# Patient Record
Sex: FEMALE | Race: WHITE | Employment: OTHER | URBAN - METROPOLITAN AREA
[De-identification: names, ages, dates, MRNs, and addresses within clinical notes are randomized per-mention and may not be internally consistent; named-entity substitution may affect disease eponyms.]

---

## 2017-01-31 ENCOUNTER — HOSPITAL ENCOUNTER (OUTPATIENT)
Dept: NUCLEAR MEDICINE | Age: 67
Discharge: HOME | End: 2017-01-31
Attending: INTERNAL MEDICINE
Payer: MEDICARE

## 2017-01-31 DIAGNOSIS — K80.20 GALLSTONES: ICD-10-CM

## 2017-01-31 DIAGNOSIS — R11.2 NAUSEA AND VOMITING: ICD-10-CM

## 2017-01-31 PROCEDURE — 78264 GASTRIC EMPTYING IMG STUDY: CPT

## 2017-02-22 ENCOUNTER — ANESTHESIA (OUTPATIENT)
Dept: ENDOSCOPY | Age: 67
End: 2017-02-22
Payer: MEDICARE

## 2017-02-22 ENCOUNTER — SURGERY (OUTPATIENT)
Age: 67
End: 2017-02-22

## 2017-02-22 ENCOUNTER — ANESTHESIA EVENT (OUTPATIENT)
Dept: ENDOSCOPY | Age: 67
End: 2017-02-22
Payer: MEDICARE

## 2017-02-22 ENCOUNTER — HOSPITAL ENCOUNTER (OUTPATIENT)
Age: 67
Setting detail: OUTPATIENT SURGERY
Discharge: HOME | End: 2017-02-22
Attending: INTERNAL MEDICINE | Admitting: INTERNAL MEDICINE
Payer: MEDICARE

## 2017-02-22 VITALS
HEART RATE: 67 BPM | WEIGHT: 148.06 LBS | BODY MASS INDEX: 23.8 KG/M2 | RESPIRATION RATE: 20 BRPM | TEMPERATURE: 97.9 F | HEIGHT: 66 IN | DIASTOLIC BLOOD PRESSURE: 76 MMHG | OXYGEN SATURATION: 98 % | SYSTOLIC BLOOD PRESSURE: 110 MMHG

## 2017-02-22 LAB
H PYLORI FROM TISSUE: NEGATIVE
KIT LOT NO., HCLOLOT: NORMAL
NEGATIVE CONTROL: NEGATIVE
POSITIVE CONTROL: POSITIVE

## 2017-02-22 PROCEDURE — 76040000019: Performed by: INTERNAL MEDICINE

## 2017-02-22 PROCEDURE — 76060000031 HC ANESTHESIA FIRST 0.5 HR: Performed by: INTERNAL MEDICINE

## 2017-02-22 PROCEDURE — 74011250636 HC RX REV CODE- 250/636: Performed by: INTERNAL MEDICINE

## 2017-02-22 PROCEDURE — 77030019988 HC FCPS ENDOSC DISP BSC -B: Performed by: INTERNAL MEDICINE

## 2017-02-22 PROCEDURE — 74011250636 HC RX REV CODE- 250/636

## 2017-02-22 PROCEDURE — 88305 TISSUE EXAM BY PATHOLOGIST: CPT | Performed by: INTERNAL MEDICINE

## 2017-02-22 PROCEDURE — 74011000250 HC RX REV CODE- 250

## 2017-02-22 PROCEDURE — 87077 CULTURE AEROBIC IDENTIFY: CPT | Performed by: INTERNAL MEDICINE

## 2017-02-22 RX ORDER — FENTANYL CITRATE 50 UG/ML
25 INJECTION, SOLUTION INTRAMUSCULAR; INTRAVENOUS
Status: DISCONTINUED | OUTPATIENT
Start: 2017-02-22 | End: 2017-02-22 | Stop reason: HOSPADM

## 2017-02-22 RX ORDER — MIDAZOLAM HYDROCHLORIDE 1 MG/ML
1-2 INJECTION, SOLUTION INTRAMUSCULAR; INTRAVENOUS
Status: DISCONTINUED | OUTPATIENT
Start: 2017-02-22 | End: 2017-02-22 | Stop reason: HOSPADM

## 2017-02-22 RX ORDER — SODIUM CHLORIDE 0.9 % (FLUSH) 0.9 %
5-10 SYRINGE (ML) INJECTION EVERY 8 HOURS
Status: DISCONTINUED | OUTPATIENT
Start: 2017-02-22 | End: 2017-02-22 | Stop reason: HOSPADM

## 2017-02-22 RX ORDER — SODIUM CHLORIDE 9 MG/ML
100 INJECTION, SOLUTION INTRAVENOUS CONTINUOUS
Status: DISCONTINUED | OUTPATIENT
Start: 2017-02-22 | End: 2017-02-22 | Stop reason: HOSPADM

## 2017-02-22 RX ORDER — FLUMAZENIL 0.1 MG/ML
0.2 INJECTION INTRAVENOUS
Status: DISCONTINUED | OUTPATIENT
Start: 2017-02-22 | End: 2017-02-22 | Stop reason: HOSPADM

## 2017-02-22 RX ORDER — SODIUM CHLORIDE 0.9 % (FLUSH) 0.9 %
5-10 SYRINGE (ML) INJECTION AS NEEDED
Status: DISCONTINUED | OUTPATIENT
Start: 2017-02-22 | End: 2017-02-22 | Stop reason: HOSPADM

## 2017-02-22 RX ORDER — PROPOFOL 10 MG/ML
10-1000 INJECTION, EMULSION INTRAVENOUS
Status: DISCONTINUED | OUTPATIENT
Start: 2017-02-22 | End: 2017-02-22 | Stop reason: HOSPADM

## 2017-02-22 RX ORDER — ATROPINE SULFATE 0.1 MG/ML
0.5 INJECTION INTRAVENOUS
Status: DISCONTINUED | OUTPATIENT
Start: 2017-02-22 | End: 2017-02-22 | Stop reason: HOSPADM

## 2017-02-22 RX ORDER — PROPOFOL 10 MG/ML
INJECTION, EMULSION INTRAVENOUS AS NEEDED
Status: DISCONTINUED | OUTPATIENT
Start: 2017-02-22 | End: 2017-02-22 | Stop reason: HOSPADM

## 2017-02-22 RX ORDER — OMEPRAZOLE 40 MG/1
40 CAPSULE, DELAYED RELEASE ORAL DAILY
Qty: 30 CAP | Refills: 3 | Status: ON HOLD | OUTPATIENT
Start: 2017-02-22 | End: 2020-05-09

## 2017-02-22 RX ORDER — DEXTROMETHORPHAN/PSEUDOEPHED 2.5-7.5/.8
1.2 DROPS ORAL
Status: DISCONTINUED | OUTPATIENT
Start: 2017-02-22 | End: 2017-02-22 | Stop reason: HOSPADM

## 2017-02-22 RX ORDER — LIDOCAINE HYDROCHLORIDE 20 MG/ML
INJECTION, SOLUTION EPIDURAL; INFILTRATION; INTRACAUDAL; PERINEURAL AS NEEDED
Status: DISCONTINUED | OUTPATIENT
Start: 2017-02-22 | End: 2017-02-22 | Stop reason: HOSPADM

## 2017-02-22 RX ORDER — EPINEPHRINE 0.1 MG/ML
1 INJECTION INTRACARDIAC; INTRAVENOUS
Status: DISCONTINUED | OUTPATIENT
Start: 2017-02-22 | End: 2017-02-22 | Stop reason: HOSPADM

## 2017-02-22 RX ADMIN — LIDOCAINE HYDROCHLORIDE 20 MG: 20 INJECTION, SOLUTION EPIDURAL; INFILTRATION; INTRACAUDAL; PERINEURAL at 11:42

## 2017-02-22 RX ADMIN — PROPOFOL 30 MG: 10 INJECTION, EMULSION INTRAVENOUS at 11:43

## 2017-02-22 RX ADMIN — PROPOFOL 30 MG: 10 INJECTION, EMULSION INTRAVENOUS at 11:46

## 2017-02-22 RX ADMIN — PROPOFOL 30 MG: 10 INJECTION, EMULSION INTRAVENOUS at 11:45

## 2017-02-22 RX ADMIN — PROPOFOL 30 MG: 10 INJECTION, EMULSION INTRAVENOUS at 11:42

## 2017-02-22 RX ADMIN — SODIUM CHLORIDE 100 ML/HR: 900 INJECTION, SOLUTION INTRAVENOUS at 11:23

## 2017-02-22 RX ADMIN — PROPOFOL 30 MG: 10 INJECTION, EMULSION INTRAVENOUS at 11:47

## 2017-02-22 RX ADMIN — PROPOFOL 30 MG: 10 INJECTION, EMULSION INTRAVENOUS at 11:44

## 2017-02-22 NOTE — ROUTINE PROCESS
Amanda Springer  1950  [id number]    Situation:  Verbal report received from: Anegl Madison RN  Procedure: Procedure(s):  ESOPHAGOGASTRODUODENOSCOPY (EGD)  ESOPHAGOGASTRODUODENAL (EGD) BIOPSY    Background:    Preoperative diagnosis: NAUSEA,VOMITING  Postoperative diagnosis: esophagitis, gastritis    :  Dr. Saloni Atkins  Assistant(s): Endoscopy Technician-1: Shivani Batista  Endoscopy RN-1: Colonel Caden RN    Specimens:   ID Type Source Tests Collected by Time Destination   1 : duodenum biopsy Preservative Duodenum  Nunu Salazar MD 2/22/2017 1148 Pathology   2 : g e junction biopsy Preservative   Nunu Salazar MD 2/22/2017 1148 Pathology     H. Pylori  yes    Assessment:  Intra-procedure medications       Anesthesia gave intra-procedure sedation and medications, see anesthesia flow sheet yes    Intravenous fluids: NS@ KVO     Vital signs stable       Abdominal assessment: round and soft       Recommendation:  Discharge patient per MD order  .     Family or Friend Isaac Lasso Friend  Permission to share finding with family or friend yes

## 2017-02-22 NOTE — IP AVS SNAPSHOT
[address] [facility] 
[phone] Patient: June Patel MRN: [id number] QOP:[id number] You are allergic to the following Allergen Reactions Codeine Nausea and Vomiting Nsaids (Non-Steroidal Anti-Inflammatory Drug) Angioedema Oxycodone Nausea and Vomiting Percocet (Oxycodone-Acetaminophen) Nausea and Vomiting Recent Documentation Height 1.664 m Emergency Contacts Name Discharge Info Relation Home Work Mobile Tesfaye Molina DISCHARGE CAREGIVER [3] Son [22] [phone] About your hospitalization You were admitted on:  February 22, 2017 You last received care in the:  [facility] You were discharged on:  February 22, 2017 Unit phone number:  [phone] Why you were hospitalized Your primary diagnosis was:  Not on File Providers Seen During Your Hospitalizations Provider Role Specialty Primary office phone Tho Turner MD Attending Provider Gastroenterology [phone] Your Primary Care Physician (PCP) Primary Care Physician Office Phone Office Fax Idris Marin [phone] Follow-up Information Follow up With Details Comments Contact Info Annette Bradley, [address] [place] [address] 
[phone] Current Discharge Medication List  
  
START taking these medications Dose & Instructions Dispensing Information Comments Morning Noon Evening Bedtime  
 omeprazole 40 mg capsule Commonly known as:  PRILOSEC Your next dose is: Today, Tomorrow Other:  _________ Dose:  40 mg Take 1 Cap by mouth daily. Indications: GASTROESOPHAGEAL REFLUX, gastritis Quantity:  30 Cap Refills:  3 CONTINUE these medications which have NOT CHANGED Dose & Instructions Dispensing Information Comments Morning Noon Evening Bedtime ACETAMINOPHEN PO Your next dose is: Today, Tomorrow Other:  _________ Dose:  650 mg Take 650 mg by mouth every six (6) hours as needed. Refills:  0  
     
   
   
   
  
 aspirin 81 mg chewable tablet Your next dose is: Today, Tomorrow Other:  _________ Dose:  81 mg Take 81 mg by mouth daily. Refills:  0  
     
   
   
   
  
 cholecalciferol 400 unit Tab tablet Commonly known as:  VITAMIN D3 Your next dose is: Today, Tomorrow Other:  _________ Dose:  400 Units Take 400 Units by mouth daily. Refills:  0  
     
   
   
   
  
 melatonin 1 mg tablet Your next dose is: Today, Tomorrow Other:  _________ Take  by mouth. Refills:  0  
     
   
   
   
  
 SYNTHROID 200 mcg tablet Generic drug:  levothyroxine Your next dose is: Today, Tomorrow Other:  _________ Dose:  125 mcg Take 125 mcg by mouth Daily (before breakfast). Refills:  0 Where to Get Your Medications Information on where to get these meds will be given to you by the nurse or doctor. ! Ask your nurse or doctor about these medications  
  omeprazole 40 mg capsule Discharge Instructions Manav Neal MD 
[facility], [address] [place], [place] [phone] 
[email or web address] [id number] 
1950 EGD DISCHARGE INSTRUCTIONS Discomfort: 
Sore throat- throat lozenges or warm salt water gargle 
redness at IV site- apply warm compress to area; if redness or soreness persist- contact your physician Gaseous discomfort- walking, belching will help relieve any discomfort You may not operate a vehicle for 12 hours You may not engage in an occupation involving machinery or appliances for rest of today You may not drink alcoholic beverages for at least 12 hours Avoid making any critical decisions for at least 24 hour DIET You may have anything by mouth. You may eat and drink immediately. You may resume your regular diet  however -  remember your colon is empty and a heavy meal will produce gas. Avoid these foods:  vegetables, fried / greasy foods, carbonated drinks ACTIVITY You may resume your normal daily activities Spend the remainder of the day resting -  avoid any strenuous activity. CALL M.D. ANY SIGN OF Increasing pain, nausea, vomiting Abdominal distension (swelling) New increased bleeding (oral or rectal) Fever (chills) Pain in chest area Bloody discharge from nose or mouth Shortness of breath Follow-up Instructions: 
 Call Dr. Sahil Martinez for any questions or problems. Telephone # [phone] Dr. Minerva Cochran office will notify you of the biopsy results available  Within 7 to 10 days. We will call you or send a letter ENDOSCOPY FINDINGS: 
 Your endoscopy showed some mild inflammation in the esophagus and stomach so would like to start omeprazole 40 mg daily and see if it helps. DISCHARGE SUMMARY from Nurse The following personal items collected during your admission are returned to you:  
Dental Appliance:   
Vision: Visual Aid: Glasses Hearing Aid:   
Jewelry:   
Clothing:   
Other Valuables:   
Valuables sent to safe:   
 
Discharge Orders None Introducing [facility]! Flavia Moran introduces [employer] patient portal. Now you can access parts of your medical record, email your doctor's office, and request medication refills online. 1. In your internet browser, go to [email or web address] 2. Click on the First Time User? Click Here link in the Sign In box. You will see the New Member Sign Up page. 3. Enter your [employer] Access Code exactly as it appears below. You will not need to use this code after youve completed the sign-up process. If you do not sign up before the expiration date, you must request a new code. · [employer] Access Code: [email or web address] Expires: 3/12/2017  9:45 AM 
 
4. Enter the last four digits of your Social Security Number (xxxx) and Date of Birth (mm/dd/yyyy) as indicated and click Submit. You will be taken to the next sign-up page. 5. Create a mascotsecret ID. This will be your [employer]secret login ID and cannot be changed, so think of one that is secure and easy to remember. 6. Create a [employer] password. You can change your password at any time. 7. Enter your Password Reset Question and Answer. This can be used at a later time if you forget your password. 8. Enter your e-mail address. You will receive e-mail notification when new information is available in [address] 9. Click Sign Up. You can now view and download portions of your medical record. 10. Click the Download Summary menu link to download a portable copy of your medical information. If you have questions, please visit the Frequently Asked Questions section of the [employer] website. Remember, [employer] is NOT to be used for urgent needs. For medical emergencies, dial 911. Now available from your iPhone and Android! General Information Please provide this summary of care documentation to your next provider. Patient Signature:  ____________________________________________________________ Date:  ____________________________________________________________  
  
Hermilo Paris Provider Signature:  ____________________________________________________________ Date:  ____________________________________________________________

## 2017-02-22 NOTE — PROCEDURES
[facility]                   Endoscopic Gastroduodenoscopy Procedure Note      2/22/2017  Lino Vallejo  1950  [id number]    Procedure: Endoscopic Gastroduodenoscopy with biopsy    Indication:  Persistent nausea and vomiting     Pre-operative Diagnosis: see indication above    Post-operative Diagnosis: see findings below    : ALESHA Ugalde MD    Referring Provider:  CHRISS Galvan      Anesthesia/Sedation:  MAC anesthesia Propofol    Airway assessment: No airway problems anticipated    Pre-Procedural Exam:      Airway: clear, no airway problems anticipated  Heart: RRR, without gallops or rubs  Lungs: clear bilaterally without wheezes, crackles, or rhonchi  Abdomen: soft, nontender, nondistended, bowel sounds present  Mental Status: awake, alert and oriented to person, place and time       Procedure Details     After infomed consent was obtained for the procedure, with all risks and benefits of procedure explained the patient was taken to the endoscopy suite and placed in the left lateral decubitus position. Following sequential administration of sedation as per above, the endoscope was inserted into the mouth and advanced under direct vision to second portion of the duodenum. A careful inspection was made as the gastroscope was withdrawn, including a retroflexed view of the proximal stomach; findings and interventions are described below. Findings:   Esophagus:  Mild erythema at the GE junction, biopsied  Stomach: Minimal patchy erythema, biopsied for pyloritek  Duodenum: normal, biopsied    Therapies:  biopsy of esophagus  biopsy of duodenal second portion    Specimens: 1. Duodenal biopsies  2. Biopsies of the GE junction           Complications:   None; patient tolerated the procedure well. EBL:  None.             Impression:      -mild esophagitis and gastritis, biopsied    Recommendations:  -Start acid suppression with a proton pump inhibitor---omeprazole 40 mg daily. , -Await pathology. , -Await pyloritek test result and treat for Helicobacter pylori if positive. , -Follow symptoms. , -If not better then consider proceeding with cholecystectomy    Jakie Severe., MD2/22/2017

## 2017-02-22 NOTE — PERIOP NOTES
Anesthesia reports 170mg Propofol, 20mg Lidocaine and 200mL NS given during procedure. Received report from anesthesia staff on vital signs and status of patient.

## 2017-02-22 NOTE — H&P
Lucien Reid MD  [facility], [address]  [place], [address] [facility]  [phone]  [email or web address]      See office H and P. No interval change. Date of Surgery Update:  Oren Moses was seen and examined. History and physical has been reviewed. The patient has been examined.  There have been no significant clinical changes since the completion of the originally dated History and Physical.    Signed By: Leilani Guerrier MD     February 22, 2017 11:22 AM

## 2017-02-22 NOTE — ANESTHESIA PREPROCEDURE EVALUATION
Anesthetic History     PONV          Review of Systems / Medical History  Patient summary reviewed, nursing notes reviewed and pertinent labs reviewed    Pulmonary  Within defined limits                 Neuro/Psych   Within defined limits           Cardiovascular  Within defined limits                Exercise tolerance: >4 METS     GI/Hepatic/Renal  Within defined limits              Endo/Other      Hypothyroidism  Arthritis     Other Findings              Physical Exam    Airway  Mallampati: II  TM Distance: 4 - 6 cm  Neck ROM: normal range of motion   Mouth opening: Normal     Cardiovascular  Regular rate and rhythm,  S1 and S2 normal,  no murmur, click, rub, or gallop             Dental  No notable dental hx       Pulmonary  Breath sounds clear to auscultation               Abdominal  GI exam deferred       Other Findings            Anesthetic Plan    ASA: 2  Anesthesia type: total IV anesthesia and MAC          Induction: Intravenous  Anesthetic plan and risks discussed with: Patient

## 2017-02-22 NOTE — DISCHARGE INSTRUCTIONS
Roslyn Kim MD  [facility], [address]  [address]  [phone]  [email or web address]    Angy Woo  [id number]  1950    EGD DISCHARGE INSTRUCTIONS    Discomfort:  Sore throat- throat lozenges or warm salt water gargle  redness at IV site- apply warm compress to area; if redness or soreness persist- contact your physician  Gaseous discomfort- walking, belching will help relieve any discomfort  You may not operate a vehicle for 12 hours  You may not engage in an occupation involving machinery or appliances for rest of today  You may not drink alcoholic beverages for at least 12 hours  Avoid making any critical decisions for at least 24 hour  DIET  You may have anything by mouth. You may eat and drink immediately. You may resume your regular diet - however -  remember your colon is empty and a heavy meal will produce gas. Avoid these foods:  vegetables, fried / greasy foods, carbonated drinks      ACTIVITY  You may resume your normal daily activities   Spend the remainder of the day resting -  avoid any strenuous activity. CALL M.D. ANY SIGN OF   Increasing pain, nausea, vomiting  Abdominal distension (swelling)  New increased bleeding (oral or rectal)  Fever (chills)  Pain in chest area  Bloody discharge from nose or mouth  Shortness of breath    Follow-up Instructions:   Call Dr. Roslyn Kim for any questions or problems. Telephone # [phone]  Dr. Mouna Yoder office will notify you of the biopsy results available  Within 7 to 10 days. We will call you or send a letter       ENDOSCOPY FINDINGS:   Your endoscopy showed some mild inflammation in the esophagus and stomach so would like to start omeprazole 40 mg daily and see if it helps.       DISCHARGE SUMMARY from Nurse    The following personal items collected during your admission are returned to you:   Dental Appliance:    Vision: Visual Aid: Glasses  Hearing Aid:    Jewelry:    Clothing:    Other Valuables:    Valuables sent to safe:

## 2017-02-23 NOTE — ANESTHESIA POSTPROCEDURE EVALUATION
Post-Anesthesia Evaluation and Assessment    Patient: Ashley Streeter MRN: [id number]  SSN: [id number]    YOB: 1950  Age: 78 y.o. Sex: female       Cardiovascular Function/Vital Signs  Visit Vitals    /76    Pulse 67    Temp 36.6 °C (97.9 °F)    Resp 20    Ht 5' 5.5\" (1.664 m)    Wt 67.2 kg (148 lb 1 oz)    SpO2 98%    Breastfeeding No    BMI 24.26 kg/m2       Patient is status post general, total IV anesthesia anesthesia for Procedure(s):  ESOPHAGOGASTRODUODENOSCOPY (EGD)  ESOPHAGOGASTRODUODENAL (EGD) BIOPSY. Nausea/Vomiting: None    Postoperative hydration reviewed and adequate. Pain:  Pain Scale 1: Numeric (0 - 10) (02/22/17 1216)  Pain Intensity 1: 0 (02/22/17 1216)   Managed    Neurological Status: At baseline    Mental Status and Level of Consciousness: Arousable    Pulmonary Status:   O2 Device: Room air (02/22/17 1216)   Adequate oxygenation and airway patent    Complications related to anesthesia: None    Post-anesthesia assessment completed.  No concerns    Signed By: Ozella Koyanagi, MD     February 23, 2017

## 2017-04-25 ENCOUNTER — HOSPITAL ENCOUNTER (OUTPATIENT)
Dept: MAMMOGRAPHY | Age: 67
Discharge: HOME | End: 2017-04-25
Attending: PHYSICIAN ASSISTANT
Payer: MEDICARE

## 2017-04-25 DIAGNOSIS — Z12.31 VISIT FOR SCREENING MAMMOGRAM: ICD-10-CM

## 2017-04-25 PROCEDURE — 77067 SCR MAMMO BI INCL CAD: CPT

## 2017-10-25 ENCOUNTER — TELEPHONE (OUTPATIENT)
Dept: GYNECOLOGY | Age: 67
End: 2017-10-25

## 2017-10-25 NOTE — TELEPHONE ENCOUNTER
Dr. Yessica Toure called requesting information on when he may stop doing pap smears on pt, he states she is HPV positive, per Dr. Heath Grajeda since she is HPV positive he needs to keep doing pap smears, Dr. Dheeraj Chapin asked if she becomes HPV negative can he stop, per Dr. Heath Grajeda, no. Since she is positive, he needs to continue doing pap smears.   Dr. Dheeraj Chapin verbalized understanding

## 2018-05-15 ENCOUNTER — HOSPITAL ENCOUNTER (OUTPATIENT)
Dept: MAMMOGRAPHY | Age: 68
Discharge: HOME | End: 2018-05-15
Attending: FAMILY MEDICINE
Payer: MEDICARE

## 2018-05-15 DIAGNOSIS — Z12.39 SCREENING BREAST EXAMINATION: ICD-10-CM

## 2018-05-15 PROCEDURE — 77067 SCR MAMMO BI INCL CAD: CPT

## 2019-05-21 ENCOUNTER — HOSPITAL ENCOUNTER (OUTPATIENT)
Dept: MAMMOGRAPHY | Age: 69
Discharge: HOME | End: 2019-05-21
Attending: FAMILY MEDICINE
Payer: MEDICARE

## 2019-05-21 DIAGNOSIS — Z12.39 SCREENING BREAST EXAMINATION: ICD-10-CM

## 2019-05-21 PROCEDURE — 77067 SCR MAMMO BI INCL CAD: CPT

## 2020-05-08 ENCOUNTER — APPOINTMENT (OUTPATIENT)
Dept: CT IMAGING | Age: 70
End: 2020-05-08
Attending: EMERGENCY MEDICINE
Payer: MEDICARE

## 2020-05-08 ENCOUNTER — APPOINTMENT (OUTPATIENT)
Dept: GENERAL RADIOLOGY | Age: 70
End: 2020-05-08
Attending: EMERGENCY MEDICINE
Payer: MEDICARE

## 2020-05-08 ENCOUNTER — HOSPITAL ENCOUNTER (OUTPATIENT)
Age: 70
Setting detail: OBSERVATION
Discharge: HOME | End: 2020-05-09
Attending: EMERGENCY MEDICINE | Admitting: INTERNAL MEDICINE
Payer: MEDICARE

## 2020-05-08 DIAGNOSIS — R41.0 TRANSIENT CONFUSION: ICD-10-CM

## 2020-05-08 DIAGNOSIS — G45.4 TGA (TRANSIENT GLOBAL AMNESIA): ICD-10-CM

## 2020-05-08 DIAGNOSIS — R77.8 ELEVATED TROPONIN: Primary | ICD-10-CM

## 2020-05-08 LAB
ALBUMIN SERPL-MCNC: 4 G/DL (ref 3.5–5)
ALBUMIN/GLOB SERPL: 1 {RATIO} (ref 1.1–2.2)
ALP SERPL-CCNC: 95 U/L (ref 45–117)
ALT SERPL-CCNC: 16 U/L (ref 12–78)
AMMONIA PLAS-SCNC: 15 UMOL/L
ANION GAP SERPL CALC-SCNC: 4 MMOL/L (ref 5–15)
APPEARANCE UR: CLEAR
AST SERPL-CCNC: 16 U/L (ref 15–37)
BACTERIA URNS QL MICRO: NEGATIVE /HPF
BASOPHILS # BLD: 0.1 K/UL (ref 0–0.1)
BASOPHILS NFR BLD: 1 % (ref 0–1)
BILIRUB SERPL-MCNC: 0.5 MG/DL (ref 0.2–1)
BILIRUB UR QL: NEGATIVE
BUN SERPL-MCNC: 16 MG/DL (ref 6–20)
BUN/CREAT SERPL: 16 (ref 12–20)
CALCIUM SERPL-MCNC: 9.1 MG/DL (ref 8.5–10.1)
CHLORIDE SERPL-SCNC: 105 MMOL/L (ref 97–108)
CO2 SERPL-SCNC: 28 MMOL/L (ref 21–32)
COLOR UR: ABNORMAL
CREAT SERPL-MCNC: 1.01 MG/DL (ref 0.55–1.02)
DIFFERENTIAL METHOD BLD: ABNORMAL
EOSINOPHIL # BLD: 0 K/UL (ref 0–0.4)
EOSINOPHIL NFR BLD: 0 % (ref 0–7)
EPITH CASTS URNS QL MICRO: ABNORMAL /LPF
ERYTHROCYTE [DISTWIDTH] IN BLOOD BY AUTOMATED COUNT: 12.2 % (ref 11.5–14.5)
GLOBULIN SER CALC-MCNC: 4.1 G/DL (ref 2–4)
GLUCOSE SERPL-MCNC: 115 MG/DL (ref 65–100)
GLUCOSE UR STRIP.AUTO-MCNC: NEGATIVE MG/DL
HCT VFR BLD AUTO: 41.6 % (ref 35–47)
HGB BLD-MCNC: 14 G/DL (ref 11.5–16)
HGB UR QL STRIP: NEGATIVE
HYALINE CASTS URNS QL MICRO: ABNORMAL /LPF (ref 0–5)
IMM GRANULOCYTES # BLD AUTO: 0 K/UL (ref 0–0.04)
IMM GRANULOCYTES NFR BLD AUTO: 0 % (ref 0–0.5)
KETONES UR QL STRIP.AUTO: NEGATIVE MG/DL
LEUKOCYTE ESTERASE UR QL STRIP.AUTO: ABNORMAL
LYMPHOCYTES # BLD: 1 K/UL (ref 0.8–3.5)
LYMPHOCYTES NFR BLD: 11 % (ref 12–49)
MCH RBC QN AUTO: 29.1 PG (ref 26–34)
MCHC RBC AUTO-ENTMCNC: 33.7 G/DL (ref 30–36.5)
MCV RBC AUTO: 86.5 FL (ref 80–99)
MONOCYTES # BLD: 0.4 K/UL (ref 0–1)
MONOCYTES NFR BLD: 4 % (ref 5–13)
NEUTS SEG # BLD: 8 K/UL (ref 1.8–8)
NEUTS SEG NFR BLD: 84 % (ref 32–75)
NITRITE UR QL STRIP.AUTO: NEGATIVE
NRBC # BLD: 0 K/UL (ref 0–0.01)
NRBC BLD-RTO: 0 PER 100 WBC
PH UR STRIP: 6.5 [PH] (ref 5–8)
PLATELET # BLD AUTO: 298 K/UL (ref 150–400)
PMV BLD AUTO: 10.5 FL (ref 8.9–12.9)
POTASSIUM SERPL-SCNC: 3.9 MMOL/L (ref 3.5–5.1)
PROT SERPL-MCNC: 8.1 G/DL (ref 6.4–8.2)
PROT UR STRIP-MCNC: NEGATIVE MG/DL
RBC # BLD AUTO: 4.81 M/UL (ref 3.8–5.2)
RBC #/AREA URNS HPF: ABNORMAL /HPF (ref 0–5)
SODIUM SERPL-SCNC: 137 MMOL/L (ref 136–145)
SP GR UR REFRACTOMETRY: 1.01 (ref 1–1.03)
TROPONIN I SERPL-MCNC: 0.36 NG/ML
TROPONIN I SERPL-MCNC: 0.37 NG/ML
UA: UC IF INDICATED,UAUC: ABNORMAL
UROBILINOGEN UR QL STRIP.AUTO: 0.2 EU/DL (ref 0.2–1)
WBC # BLD AUTO: 9.5 K/UL (ref 3.6–11)
WBC URNS QL MICRO: ABNORMAL /HPF (ref 0–4)

## 2020-05-08 PROCEDURE — 96365 THER/PROPH/DIAG IV INF INIT: CPT

## 2020-05-08 PROCEDURE — 87086 URINE CULTURE/COLONY COUNT: CPT

## 2020-05-08 PROCEDURE — 81001 URINALYSIS AUTO W/SCOPE: CPT

## 2020-05-08 PROCEDURE — 87186 SC STD MICRODIL/AGAR DIL: CPT

## 2020-05-08 PROCEDURE — 85025 COMPLETE CBC W/AUTO DIFF WBC: CPT

## 2020-05-08 PROCEDURE — 71045 X-RAY EXAM CHEST 1 VIEW: CPT

## 2020-05-08 PROCEDURE — 70450 CT HEAD/BRAIN W/O DYE: CPT

## 2020-05-08 PROCEDURE — 80053 COMPREHEN METABOLIC PANEL: CPT

## 2020-05-08 PROCEDURE — 74011000258 HC RX REV CODE- 258: Performed by: EMERGENCY MEDICINE

## 2020-05-08 PROCEDURE — 87077 CULTURE AEROBIC IDENTIFY: CPT

## 2020-05-08 PROCEDURE — 74011250636 HC RX REV CODE- 250/636: Performed by: EMERGENCY MEDICINE

## 2020-05-08 PROCEDURE — 36415 COLL VENOUS BLD VENIPUNCTURE: CPT

## 2020-05-08 PROCEDURE — 82140 ASSAY OF AMMONIA: CPT

## 2020-05-08 PROCEDURE — 84484 ASSAY OF TROPONIN QUANT: CPT

## 2020-05-08 PROCEDURE — 99285 EMERGENCY DEPT VISIT HI MDM: CPT

## 2020-05-08 PROCEDURE — 93005 ELECTROCARDIOGRAM TRACING: CPT

## 2020-05-08 RX ORDER — ONDANSETRON 2 MG/ML
4 INJECTION INTRAMUSCULAR; INTRAVENOUS
Status: DISCONTINUED | OUTPATIENT
Start: 2020-05-08 | End: 2020-05-09 | Stop reason: HOSPADM

## 2020-05-08 RX ORDER — ACETAMINOPHEN 325 MG/1
650 TABLET ORAL
Status: DISCONTINUED | OUTPATIENT
Start: 2020-05-08 | End: 2020-05-09 | Stop reason: HOSPADM

## 2020-05-08 RX ADMIN — CEFTRIAXONE SODIUM 2 G: 2 INJECTION, POWDER, FOR SOLUTION INTRAMUSCULAR; INTRAVENOUS at 21:15

## 2020-05-08 NOTE — ED NOTES
Bedside and Verbal shift change report given to gera RN  (oncoming nurse) by Mirta Anderson RN (offgoing nurse). Report included the following information SBAR, ED Summary, MAR and Recent Results.

## 2020-05-08 NOTE — ED PROVIDER NOTES
EMERGENCY DEPARTMENT HISTORY AND PHYSICAL EXAM      Date: 5/8/2020  Patient Name: Emma Lima    History of Presenting Illness     Chief Complaint   Patient presents with    Altered mental status     Family reports memory problems today; last known well yesterday. Denies extremity weakness, decreased sensation, speech difficullties, or coordination. Family also states she recieved news today her brother has Covid-19 (she doesn't remember this)       History Provided By: Patient    HPI: Emma Lima, 78 y.o. female with h/o hypothyroidism presents to the ED with cc of altered mental status. Patient states that she has memory gaps between the hours of 10 PM yesterday evening and noon today. She states that she does not recall anything that happened and family who was around her notes that she was very confused during this time. Her son who was present in the ED with her states that she was perseverating and asking the same questions over and over again this morning. Patient states that she believes her symptoms started after she received a call telling her that her brother in [place] tested positive for the coronavirus. She also endorses drinking a glass and a half of wine yesterday and did take a sleeping pill to help her sleep. She denies any fevers, chills, chest pain, shortness breath, nausea, vomiting, abdominal pain, numbness or weakness of her extremities, gait difficulty. She has never had symptoms like this in the past.  She does endorse some frequency and urgency of her urination. She states that she feels back to baseline currently. Memory gap sometime between 10p-12p today      There are no other complaints, changes, or physical findings at this time. PCP: Mak Snow MD    No current facility-administered medications on file prior to encounter.       Current Outpatient Medications on File Prior to Encounter   Medication Sig Dispense Refill    levothyroxine (Synthroid) 125 mcg tablet Take 125 mcg by mouth Daily (before breakfast).  [DISCONTINUED] omeprazole (PRILOSEC) 40 mg capsule Take 1 Cap by mouth daily. Indications: GASTROESOPHAGEAL REFLUX, gastritis 30 Cap 3    [DISCONTINUED] aspirin 81 mg chewable tablet Take 81 mg by mouth daily.  melatonin 1 mg tablet Take  by mouth.  cholecalciferol (VITAMIN D3) 400 unit tab tablet Take 400 Units by mouth daily.  ACETAMINOPHEN PO Take 650 mg by mouth every six (6) hours as needed.  [DISCONTINUED] levothyroxine (SYNTHROID) 200 mcg tablet Take 125 mcg by mouth Daily (before breakfast). Past History     Past Medical History:  Past Medical History:   Diagnosis Date    Arthritis     Hypothyroidism     Osteoporosis     Thyroid disease        Past Surgical History:  Past Surgical History:   Procedure Laterality Date    COLONOSCOPY N/A 7/27/2016    COLONOSCOPY performed by [facility]yn [facility] at [facility] ENDOSCOPY    COLONOSCOPY,DIAGNOSTIC  7/27/2016         COLORECTAL SCRN; HI RISK IND  7/27/2016         HX APPENDECTOMY  1977    HX DILATION AND CURETTAGE  1999    HX GYN  1977    remove ovarian cyst    HX ORTHOPAEDIC  2/17/14    left hip    UPPER GI ENDOSCOPY,BIOPSY  2/22/2017            Family History:  Family History   Problem Relation Age of Onset    Alzheimer Mother     Cancer Mother 80        colon    Lung Disease Father     Cancer Maternal Grandmother 70        colon    Heart Disease Maternal Grandfather     Breast Cancer Maternal Aunt         late 66's       Social History:  Social History     Tobacco Use    Smoking status: Never Smoker    Smokeless tobacco: Never Used   Substance Use Topics    Alcohol use: Yes     Alcohol/week: 1.7 standard drinks     Types: 2 Glasses of wine per week    Drug use: No       Allergies:   Allergies   Allergen Reactions    Codeine Nausea and Vomiting    Nsaids (Non-Steroidal Anti-Inflammatory Drug) Angioedema    Oxycodone Nausea and Vomiting  Percocet [Oxycodone-Acetaminophen] Nausea and Vomiting         Review of Systems   Review of Systems   Constitutional: Negative for chills and fever. HENT: Negative. Eyes: Negative for visual disturbance. Respiratory: Negative for cough and shortness of breath. Cardiovascular: Negative for chest pain and leg swelling. Gastrointestinal: Negative for abdominal pain, nausea and vomiting. Genitourinary: Positive for frequency and urgency. Musculoskeletal: Negative for back pain and gait problem. Skin: Negative for color change and rash. Neurological: Negative for dizziness, weakness, light-headedness and headaches. Hematological: Does not bruise/bleed easily. Psychiatric/Behavioral: Positive for confusion. All other systems reviewed and are negative. Physical Exam   Physical Exam  Vitals signs and nursing note reviewed. Constitutional:       General: She is not in acute distress. Appearance: Normal appearance. She is not ill-appearing or toxic-appearing. HENT:      Head: Normocephalic and atraumatic. Nose: Nose normal.      Mouth/Throat:      Mouth: Mucous membranes are moist.   Eyes:      Extraocular Movements: Extraocular movements intact. Pupils: Pupils are equal, round, and reactive to light. Neck:      Musculoskeletal: Normal range of motion and neck supple. Cardiovascular:      Rate and Rhythm: Normal rate and regular rhythm. Heart sounds: No murmur. Pulmonary:      Effort: Pulmonary effort is normal. No respiratory distress. Breath sounds: Normal breath sounds. No wheezing. Abdominal:      General: There is no distension. Palpations: Abdomen is soft. Tenderness: There is no abdominal tenderness. There is no guarding or rebound. Musculoskeletal: Normal range of motion. General: No swelling or tenderness. Right lower leg: No edema. Left lower leg: No edema. Skin:     General: Skin is warm and dry.       Coloration: Skin is not pale. Findings: No erythema. Neurological:      General: No focal deficit present. Mental Status: She is alert and oriented to person, place, and time. GCS: GCS eye subscore is 4. GCS verbal subscore is 5. GCS motor subscore is 6. Comments: Cranial nerves intact, motor 5/5 throughout, sensation intact, no cerebellar deficits         Diagnostic Study Results     Labs -     Recent Results (from the past 12 hour(s))   CBC WITH AUTOMATED DIFF    Collection Time: 05/08/20  3:53 PM   Result Value Ref Range    WBC 9.5 3.6 - 11.0 K/uL    RBC 4.81 3.80 - 5.20 M/uL    HGB 14.0 11.5 - 16.0 g/dL    HCT 41.6 35.0 - 47.0 %    MCV 86.5 80.0 - 99.0 FL    MCH 29.1 26.0 - 34.0 PG    MCHC 33.7 30.0 - 36.5 g/dL    RDW 12.2 11.5 - 14.5 %    PLATELET 263 342 - 281 K/uL    MPV 10.5 8.9 - 12.9 FL    NRBC 0.0 0  WBC    ABSOLUTE NRBC 0.00 0.00 - 0.01 K/uL    NEUTROPHILS 84 (H) 32 - 75 %    LYMPHOCYTES 11 (L) 12 - 49 %    MONOCYTES 4 (L) 5 - 13 %    EOSINOPHILS 0 0 - 7 %    BASOPHILS 1 0 - 1 %    IMMATURE GRANULOCYTES 0 0.0 - 0.5 %    ABS. NEUTROPHILS 8.0 1.8 - 8.0 K/UL    ABS. LYMPHOCYTES 1.0 0.8 - 3.5 K/UL    ABS. MONOCYTES 0.4 0.0 - 1.0 K/UL    ABS. EOSINOPHILS 0.0 0.0 - 0.4 K/UL    ABS. BASOPHILS 0.1 0.0 - 0.1 K/UL    ABS. IMM. GRANS. 0.0 0.00 - 0.04 K/UL    DF AUTOMATED     METABOLIC PANEL, COMPREHENSIVE    Collection Time: 05/08/20  3:53 PM   Result Value Ref Range    Sodium 137 136 - 145 mmol/L    Potassium 3.9 3.5 - 5.1 mmol/L    Chloride 105 97 - 108 mmol/L    CO2 28 21 - 32 mmol/L    Anion gap 4 (L) 5 - 15 mmol/L    Glucose 115 (H) 65 - 100 mg/dL    BUN 16 6 - 20 MG/DL    Creatinine 1.01 0.55 - 1.02 MG/DL    BUN/Creatinine ratio 16 12 - 20      GFR est AA >60 >60 ml/min/1.73m2    GFR est non-AA 54 (L) >60 ml/min/1.73m2    Calcium 9.1 8.5 - 10.1 MG/DL    Bilirubin, total 0.5 0.2 - 1.0 MG/DL    ALT (SGPT) 16 12 - 78 U/L    AST (SGOT) 16 15 - 37 U/L    Alk.  phosphatase 95 45 - 117 U/L Protein, total 8.1 6.4 - 8.2 g/dL    Albumin 4.0 3.5 - 5.0 g/dL    Globulin 4.1 (H) 2.0 - 4.0 g/dL    A-G Ratio 1.0 (L) 1.1 - 2.2     URINALYSIS W/ REFLEX CULTURE    Collection Time: 05/08/20  4:39 PM   Result Value Ref Range    Color YELLOW/STRAW      Appearance CLEAR CLEAR      Specific gravity 1.006 1.003 - 1.030      pH (UA) 6.5 5.0 - 8.0      Protein Negative NEG mg/dL    Glucose Negative NEG mg/dL    Ketone Negative NEG mg/dL    Bilirubin Negative NEG      Blood Negative NEG      Urobilinogen 0.2 0.2 - 1.0 EU/dL    Nitrites Negative NEG      Leukocyte Esterase MODERATE (A) NEG      WBC 10-20 0 - 4 /hpf    RBC 0-5 0 - 5 /hpf    Epithelial cells FEW FEW /lpf    Bacteria Negative NEG /hpf    UA:UC IF INDICATED URINE CULTURE ORDERED (A) CNI      Hyaline cast 0-2 0 - 5 /lpf   TROPONIN I    Collection Time: 05/08/20  7:42 PM   Result Value Ref Range    Troponin-I, Qt. 0.36 (H) <0.05 ng/mL   AMMONIA    Collection Time: 05/08/20  7:42 PM   Result Value Ref Range    Ammonia 15 <32 UMOL/L   EKG, 12 LEAD, INITIAL    Collection Time: 05/08/20  9:46 PM   Result Value Ref Range    Ventricular Rate 76 BPM    Atrial Rate 76 BPM    P-R Interval 144 ms    QRS Duration 74 ms    Q-T Interval 398 ms    QTC Calculation (Bezet) 447 ms    Calculated P Axis 54 degrees    Calculated R Axis -15 degrees    Calculated T Axis -20 degrees    Diagnosis       Normal sinus rhythm  Normal ECG  When compared with ECG of 05-FEB-2014 14:37,  Nonspecific T wave abnormality no longer evident in Anterolateral leads     TROPONIN I    Collection Time: 05/08/20  9:57 PM   Result Value Ref Range    Troponin-I, Qt. 0.37 (H) <0.05 ng/mL       Radiologic Studies -   CT HEAD WO CONT   Final Result   IMPRESSION: No acute intracranial abnormality. XR CHEST PORT   Final Result   IMPRESSION:    Clear lungs.       MRI BRAIN WO CONT    (Results Pending)   MRI BRAIN WO CONT    (Results Pending)     CT Results  (Last 48 hours)               05/08/20 2019  CT HEAD WO CONT Final result    Impression:  IMPRESSION: No acute intracranial abnormality. Narrative:  HEAD CT WITHOUT CONTRAST: 5/8/2020 8:19 PM       INDICATION: Altered mental status. COMPARISON: None. PROCEDURE: Axial images of the head were obtained without contrast. Coronal and   sagittal reformats were performed. CT dose reduction was achieved through use of   a standardized protocol tailored for this examination and automatic exposure   control for dose modulation. FINDINGS: The ventricles and sulci are appropriate in size and configuration for   age. No loss of gray-white differentiation to suggest late acute or early   subacute infarction. No mass effect or intracranial hemorrhage. CXR Results  (Last 48 hours)               05/08/20 1933  XR CHEST PORT Final result    Impression:  IMPRESSION:    Clear lungs. Narrative:  PORTABLE CHEST RADIOGRAPH/S: 5/8/2020 7:33 PM       INDICATION: Altered mental status. COMPARISON: None. TECHNIQUE: Portable frontal semiupright radiograph/s of the chest.       FINDINGS:    The lungs are clear. The central airways are patent. No pneumothorax or pleural   effusion. Medical Decision Making   I am the first provider for this patient. I reviewed the vital signs, available nursing notes, past medical history, past surgical history, family history and social history. Vital Signs-Reviewed the patient's vital signs.   Patient Vitals for the past 12 hrs:   Temp Pulse Resp BP SpO2   05/09/20 0100 98 °F (36.7 °C) 100 18  95 %   05/08/20 2315  71 13 128/75 95 %   05/08/20 2230  66 11 124/71 97 %   05/08/20 2215  90 19 (!) 156/98 95 %   05/08/20 2200 98.4 °F (36.9 °C) 76 19 142/81 97 %   05/08/20 2145  74 13 125/85 96 %   05/08/20 2130  71 17 134/83 95 %   05/08/20 1516 98.8 °F (37.1 °C) 93 18 169/81 99 %       Records Reviewed: Nursing Notes    Provider Notes (Medical Decision Making):   78year-old female here with altered mental status and confusion which appears to be consistent with transient global amnesia. On my examination she is in no acute distress. She has NIHSS of 0 on my exam.  She is afebrile vital signs are stable. She has no other complaints currently in the ED. She is very concerned about this transient episode of confusion which lasted approximately 15 hours. Her symptoms may be consistent with transient global amnesia or a combination of urinary tract infection, EtOH combined with a sleeping pill last night. CT head does not show any acute process. Urinalysis concerning for possible infection  And I will treat with Rocephin in the ED. She is noted to have elevated troponin of 0.36. EKG is nonischemic. I trended this troponin and it resulted 2 hours later at 0.37. Patient does not have any complaints of chest pain or shortness of breath and I doubt her confusion is an anginal equivalent. However she has normal renal function and I do not have another cause for her elevated troponin. After discussion with patient, son, decision was made to admit patient observation to trend her troponin and for further evaluation of her transient confusion episode. ED Course:   Initial assessment performed. The patients presenting problems have been discussed, and they are in agreement with the care plan formulated and outlined with them. I have encouraged them to ask questions as they arise throughout their visit. ED Course as of May 09 0251   Fri May 08, 2020   2216 EKG per my interpretation normal sinus rhythm, rate 76 bpm, normal axis, no acute ischemic changes. [AK]      ED Course User Index  [AK] Ion Barrett MD       Admission Note:  Patient is being admitted to the hospital by Dr. Vanessa Tobias, Service: Hospitalist.  The results of their tests and reasons for their admission have been discussed with them and available family.  They convey agreement and understanding for the need to be admitted and for their admission diagnosis. Disposition:  Admit Obs      Diagnosis     Clinical Impression:   1. Elevated troponin    2. TGA (transient global amnesia)    3. Transient confusion        Attestations:    Della Giles MD    Please note that this dictation was completed with [employer], the computer voice recognition software. Quite often unanticipated grammatical, syntax, homophones, and other interpretive errors are inadvertently transcribed by the computer software. Please disregard these errors. Please excuse any errors that have escaped final proofreading. Thank you.

## 2020-05-09 ENCOUNTER — APPOINTMENT (OUTPATIENT)
Dept: MRI IMAGING | Age: 70
End: 2020-05-09
Attending: INTERNAL MEDICINE
Payer: MEDICARE

## 2020-05-09 ENCOUNTER — APPOINTMENT (OUTPATIENT)
Dept: NON INVASIVE DIAGNOSTICS | Age: 70
End: 2020-05-09
Attending: INTERNAL MEDICINE
Payer: MEDICARE

## 2020-05-09 VITALS
DIASTOLIC BLOOD PRESSURE: 59 MMHG | BODY MASS INDEX: 23.42 KG/M2 | TEMPERATURE: 97.9 F | HEIGHT: 66 IN | OXYGEN SATURATION: 97 % | HEART RATE: 70 BPM | WEIGHT: 145.72 LBS | RESPIRATION RATE: 18 BRPM | SYSTOLIC BLOOD PRESSURE: 115 MMHG

## 2020-05-09 LAB
ANION GAP SERPL CALC-SCNC: 7 MMOL/L (ref 5–15)
ATRIAL RATE: 76 BPM
BUN SERPL-MCNC: 16 MG/DL (ref 6–20)
BUN/CREAT SERPL: 13 (ref 12–20)
CALCIUM SERPL-MCNC: 8.6 MG/DL (ref 8.5–10.1)
CALCULATED P AXIS, ECG09: 54 DEGREES
CALCULATED R AXIS, ECG10: -15 DEGREES
CALCULATED T AXIS, ECG11: -20 DEGREES
CHLORIDE SERPL-SCNC: 108 MMOL/L (ref 97–108)
CO2 SERPL-SCNC: 26 MMOL/L (ref 21–32)
CREAT SERPL-MCNC: 1.2 MG/DL (ref 0.55–1.02)
DIAGNOSIS, 93000: NORMAL
ERYTHROCYTE [DISTWIDTH] IN BLOOD BY AUTOMATED COUNT: 12.5 % (ref 11.5–14.5)
GLUCOSE SERPL-MCNC: 135 MG/DL (ref 65–100)
HCT VFR BLD AUTO: 39.5 % (ref 35–47)
HGB BLD-MCNC: 13.2 G/DL (ref 11.5–16)
MCH RBC QN AUTO: 29 PG (ref 26–34)
MCHC RBC AUTO-ENTMCNC: 33.4 G/DL (ref 30–36.5)
MCV RBC AUTO: 86.8 FL (ref 80–99)
NRBC # BLD: 0 K/UL (ref 0–0.01)
NRBC BLD-RTO: 0 PER 100 WBC
P-R INTERVAL, ECG05: 144 MS
PLATELET # BLD AUTO: 268 K/UL (ref 150–400)
PMV BLD AUTO: 10.5 FL (ref 8.9–12.9)
POTASSIUM SERPL-SCNC: 3.5 MMOL/L (ref 3.5–5.1)
Q-T INTERVAL, ECG07: 398 MS
QRS DURATION, ECG06: 74 MS
QTC CALCULATION (BEZET), ECG08: 447 MS
RBC # BLD AUTO: 4.55 M/UL (ref 3.8–5.2)
SODIUM SERPL-SCNC: 141 MMOL/L (ref 136–145)
TROPONIN I SERPL-MCNC: 0.17 NG/ML
TROPONIN I SERPL-MCNC: 0.23 NG/ML
TSH SERPL DL<=0.05 MIU/L-ACNC: 0.27 UIU/ML (ref 0.36–3.74)
VENTRICULAR RATE, ECG03: 76 BPM
WBC # BLD AUTO: 7.8 K/UL (ref 3.6–11)

## 2020-05-09 PROCEDURE — 85027 COMPLETE CBC AUTOMATED: CPT

## 2020-05-09 PROCEDURE — 74011250637 HC RX REV CODE- 250/637: Performed by: EMERGENCY MEDICINE

## 2020-05-09 PROCEDURE — 99218 HC RM OBSERVATION: CPT

## 2020-05-09 PROCEDURE — 74011250637 HC RX REV CODE- 250/637: Performed by: INTERNAL MEDICINE

## 2020-05-09 PROCEDURE — 70551 MRI BRAIN STEM W/O DYE: CPT

## 2020-05-09 PROCEDURE — 74011250636 HC RX REV CODE- 250/636: Performed by: INTERNAL MEDICINE

## 2020-05-09 PROCEDURE — 93306 TTE W/DOPPLER COMPLETE: CPT

## 2020-05-09 PROCEDURE — 70544 MR ANGIOGRAPHY HEAD W/O DYE: CPT

## 2020-05-09 PROCEDURE — 96372 THER/PROPH/DIAG INJ SC/IM: CPT

## 2020-05-09 PROCEDURE — 36415 COLL VENOUS BLD VENIPUNCTURE: CPT

## 2020-05-09 PROCEDURE — 70547 MR ANGIOGRAPHY NECK W/O DYE: CPT

## 2020-05-09 PROCEDURE — 84443 ASSAY THYROID STIM HORMONE: CPT

## 2020-05-09 PROCEDURE — 84484 ASSAY OF TROPONIN QUANT: CPT

## 2020-05-09 PROCEDURE — 80048 BASIC METABOLIC PNL TOTAL CA: CPT

## 2020-05-09 RX ORDER — LANOLIN ALCOHOL/MO/W.PET/CERES
1.5 CREAM (GRAM) TOPICAL
Status: DISCONTINUED | OUTPATIENT
Start: 2020-05-09 | End: 2020-05-09 | Stop reason: HOSPADM

## 2020-05-09 RX ORDER — GUAIFENESIN 100 MG/5ML
162 LIQUID (ML) ORAL
Status: COMPLETED | OUTPATIENT
Start: 2020-05-09 | End: 2020-05-09

## 2020-05-09 RX ORDER — LEVOTHYROXINE SODIUM 125 UG/1
125 TABLET ORAL
COMMUNITY

## 2020-05-09 RX ORDER — ENOXAPARIN SODIUM 100 MG/ML
40 INJECTION SUBCUTANEOUS EVERY 24 HOURS
Status: DISCONTINUED | OUTPATIENT
Start: 2020-05-09 | End: 2020-05-09 | Stop reason: HOSPADM

## 2020-05-09 RX ORDER — CYANOCOBALAMIN (VITAMIN B-12) 500 MCG
400 TABLET ORAL DAILY
Status: DISCONTINUED | OUTPATIENT
Start: 2020-05-09 | End: 2020-05-09 | Stop reason: HOSPADM

## 2020-05-09 RX ORDER — ASPIRIN 81 MG/1
81 TABLET ORAL DAILY
Status: DISCONTINUED | OUTPATIENT
Start: 2020-05-09 | End: 2020-05-09 | Stop reason: HOSPADM

## 2020-05-09 RX ORDER — SODIUM CHLORIDE 0.9 % (FLUSH) 0.9 %
5-40 SYRINGE (ML) INJECTION AS NEEDED
Status: DISCONTINUED | OUTPATIENT
Start: 2020-05-09 | End: 2020-05-09 | Stop reason: HOSPADM

## 2020-05-09 RX ORDER — LEVOTHYROXINE SODIUM 125 UG/1
125 TABLET ORAL
Status: DISCONTINUED | OUTPATIENT
Start: 2020-05-09 | End: 2020-05-09 | Stop reason: HOSPADM

## 2020-05-09 RX ORDER — SODIUM CHLORIDE 0.9 % (FLUSH) 0.9 %
5-40 SYRINGE (ML) INJECTION EVERY 8 HOURS
Status: DISCONTINUED | OUTPATIENT
Start: 2020-05-09 | End: 2020-05-09 | Stop reason: HOSPADM

## 2020-05-09 RX ADMIN — Medication 10 ML: at 06:06

## 2020-05-09 RX ADMIN — ACETAMINOPHEN 650 MG: 325 TABLET, FILM COATED ORAL at 15:29

## 2020-05-09 RX ADMIN — CHOLECALCIFEROL TAB 10 MCG (400 UNIT) 1 TABLET: 10 TAB at 08:47

## 2020-05-09 RX ADMIN — ENOXAPARIN SODIUM 40 MG: 40 INJECTION SUBCUTANEOUS at 08:47

## 2020-05-09 RX ADMIN — ASPIRIN 81 MG 162 MG: 81 TABLET ORAL at 01:22

## 2020-05-09 RX ADMIN — Medication 10 ML: at 15:30

## 2020-05-09 RX ADMIN — Medication 10 ML: at 01:23

## 2020-05-09 RX ADMIN — LEVOTHYROXINE SODIUM 125 MCG: 125 TABLET ORAL at 06:39

## 2020-05-09 RX ADMIN — ASPIRIN 81 MG: 81 TABLET ORAL at 08:47

## 2020-05-09 NOTE — DISCHARGE SUMMARY
Hospitalist Discharge Summary     Patient ID:  Zeina Capps  [id number]  78 y.o.  1950  5/8/2020    PCP on record: Andrea Barney MD    Admit date: 5/8/2020  Discharge date and time: 5/9/2020    DISCHARGE DIAGNOSIS:    Transient Global Amnesia POA- now resolved, back to baseline, MRI brain consistent with it  Elevated Troponins POA- trended down, likely insignificant, OP stress test recommended , cleared by Cardiology Dr Emerick Fabry  Hypothyroidism  Full code      CONSULTATIONS:  LASHON Woods    Excerpted HPI from H&P of Lida Zelaya MD:  \"77 years old female from home with past medical history significant for hypothyroidism, arthritis presented to the hospital for evaluation of change mental status started since yesterday, patient denies any focal weakness numbness any headache any blurry vision, blood work in ED was significant for elevated troponin 0.36, patient denies any fever chills and shortness of breath any chest pain. \"    ______________________________________________________________________  DISCHARGE SUMMARY/HOSPITAL COURSE:  for full details see H&P, daily progress notes, labs, consult notes. Change mental status cause unknown rule out ischemic stroke  Admit patient for observation  Stroke work-up   Neurology consultation   Start patient on aspirin  PT OT evaluation     Elevated troponin rule out non-STEMI  Follow-up serial troponinechocardiogramcardiology consultation     Hypothyroidism  check TSH level  continue Synthyroid        Code Status: Full         _______________________________________________________________________  Patient seen and examined by me on discharge day. Pertinent Findings:  Gen:    Not in distress  Chest: Clear lungs  CVS:   Regular rhythm.   No edema  Abd:  Soft, not distended, not tender  Neuro:  Alert, oriented x 3  _______________________________________________________________________  DISCHARGE MEDICATIONS:   Current Discharge Medication List      CONTINUE these medications which have NOT CHANGED    Details   levothyroxine (Synthroid) 125 mcg tablet Take 125 mcg by mouth Daily (before breakfast). melatonin 1 mg tablet Take  by mouth. cholecalciferol (VITAMIN D3) 400 unit tab tablet Take 400 Units by mouth daily. ACETAMINOPHEN PO Take 650 mg by mouth every six (6) hours as needed. STOP taking these medications       omeprazole (PRILOSEC) 40 mg capsule Comments:   Reason for Stopping:         aspirin 81 mg chewable tablet Comments:   Reason for Stopping:                 Patient Follow Up Instructions:    Activity: Activity as tolerated  Diet: Regular Diet      Follow-up with PCP as needed    Follow-up Information     Follow up With Specialties Details Why [address] [facility], [address] Christophe Romero MD [address]  [phone]          ________________________________________________________________    Risk of deterioration: Low    Condition at Discharge:  Stable  __________________________________________________________________    Disposition  Home with family, no needs    ____________________________________________________________________    Code Status: Full Code  ___________________________________________________________________      Total time in minutes spent coordinating this discharge (includes going over instructions, follow-up, prescriptions, and preparing report for sign off to her PCP) :  26 minutes    Signed:  Helen Mackey MD

## 2020-05-09 NOTE — CONSULTS
Neurology Note    Patient ID:  Lida Espinoza  [id number]  78 y.o.  1950      Date of Consultation:  May 9, 2020    Referring Physician: Dr. Norma Osuna     Reason for Consultation:  Altered mental status    Subjective:i was confused, but now much better       History of Present Illness:   Lida Espinoza is a 78 y.o. female who has a past medical history of hypothyroidism and arthritis who was brought to the emergency department due to a change in mental status which began day prior to admission. The patient denied any focal weakness, numbness, change in vision. The patient was noted to have an elevated troponin while in the emergency department. The patient states that the evening prior to admission, she had just learned that her brother had been diagnosed with COVID-19. She had her typical glass of wine with dinner but was having trouble falling asleep and took a half of a sleeping pill. She has been told that she did send some text that were not making sense and she does not remember the first few hours of the next morning when her son came by the house. There is a lapse and just overall understanding of what was happening. She was awake and alert in regards to doing activities but does not have a remembrance of it and per report she was not making complete sense. She late last evening return back to her normal self and denies any numbness, tingling, or confusion.     Past Medical History:   Diagnosis Date    Arthritis     Hypothyroidism     Osteoporosis     Thyroid disease         Past Surgical History:   Procedure Laterality Date    COLONOSCOPY N/A 7/27/2016    COLONOSCOPY performed by Magdalena Lozoya MD at [facility] ENDOSCOPY    COLONOSCOPY,DIAGNOSTIC  7/27/2016         COLORECTAL SCRN; HI RISK IND  7/27/2016         HX APPENDECTOMY  1977    HX DILATION AND CURETTAGE  1999    HX GYN  1977    remove ovarian cyst    HX ORTHOPAEDIC  2/17/14    left hip    UPPER GI ENDOSCOPY,BIOPSY  2/22/2017             Family History   Problem Relation Age of Onset    Alzheimer Mother     Cancer Mother 80        colon    Lung Disease Father     Cancer Maternal Grandmother 70        colon    Heart Disease Maternal Grandfather     Breast Cancer Maternal Aunt         late 66's        Social History     Tobacco Use    Smoking status: Never Smoker    Smokeless tobacco: Never Used   Substance Use Topics    Alcohol use: Yes     Alcohol/week: 1.7 standard drinks     Types: 2 Glasses of wine per week        Allergies   Allergen Reactions    Codeine Nausea and Vomiting    Nsaids (Non-Steroidal Anti-Inflammatory Drug) Angioedema    Oxycodone Nausea and Vomiting    Percocet [Oxycodone-Acetaminophen] Nausea and Vomiting        Prior to Admission medications    Medication Sig Start Date End Date Taking? Authorizing Provider   levothyroxine (Synthroid) 125 mcg tablet Take 125 mcg by mouth Daily (before breakfast). Yes Provider, Historical   melatonin 1 mg tablet Take  by mouth. Provider, Historical   cholecalciferol (VITAMIN D3) 400 unit tab tablet Take 400 Units by mouth daily. Provider, Historical   ACETAMINOPHEN PO Take 650 mg by mouth every six (6) hours as needed.     Provider, Historical     Current Facility-Administered Medications   Medication Dose Route Frequency    sodium chloride (NS) flush 5-40 mL  5-40 mL IntraVENous Q8H    sodium chloride (NS) flush 5-40 mL  5-40 mL IntraVENous PRN    enoxaparin (LOVENOX) injection 40 mg  40 mg SubCUTAneous Q24H    levothyroxine (SYNTHROID) tablet 125 mcg  125 mcg Oral ACB    melatonin tablet 1.5 mg  1.5 mg Oral QHS    cholecalciferol (VITAMIN D3) (400 Units /10 mcg) tablet 1 Tab  400 Units Oral DAILY    aspirin delayed-release tablet 81 mg  81 mg Oral DAILY    acetaminophen (TYLENOL) tablet 650 mg  650 mg Oral Q6H PRN    ondansetron (ZOFRAN) injection 4 mg  4 mg IntraVENous Q4H PRN       Review of Systems:    General, constitutional: slightly anxious  Eyes, vision: negative  Ears, nose, throat: negative  Cardiovascular, heart: negative  Respiratory: negative  Gastrointestinal: negative  Genitourinary: negative  Musculoskeletal: negative  Skin and integumentary: negative  Psychiatric: negative  Endocrine: negative  Neurological: negative, except for HPI  Hematologic/lymphatic: negative  Allergy/immunology: negative    Objective:     Visit Vitals  /59 (BP 1 Location: Left arm, BP Patient Position: At rest;Supine)   Pulse 88   Temp 98.1 °F (36.7 °C)   Resp 18   Ht 5' 6\" (1.676 m)   Wt 145 lb 11.6 oz (66.1 kg)   SpO2 99%   BMI 23.52 kg/m²       Physical Exam:    General:  appears well nourished in no acute distress  Neck: no carotid bruits  Lungs: clear to auscultation  Heart:  no murmurs, regular rate  Lower extremity: peripheral pulses palpable and no edema  Skin: intact    Neurological exam:    Awake, alert, oriented to person, place and time  Recent and remote memory were normal  Attention and concentration were intact  Language was intact. There was no aphasia  Speech: no dysarthria  Fund of knowledge was preserved  She named 15 words that start with the letter P in 30 seconds. Cranial nerves:   II-XII were tested    Perrrla  Fundoscopic examination revealed venous pulsations and no clear abnormalities  Visual fields were full  Eomi, no evidence of nystagmus  Facial sensation:  normal and symmetric  Facial motor: normal and symmetric  Hearing intact  SCM strength intact  Tongue: midline without fasciculations    Motor: Tone normal    No evidence of fasciculations    Strength testing:   deltoid triceps biceps Wrist ext. Wrist flex. intrinsics Hip flex. Hip ext. Knee ext.   Knee flex Dorsi flex Plantar flex   Right 5 5 5 5 5 5 5 5 5 5 5 5   Left 5 5 5 5 5 5 5 5 5 5 5 5         Sensory:  Upper extremity: intact to pp, light touch, and vibration > 10 seconds  Lower extremity: intact to pp, light touch, and vibration > 10 seconds    Reflexes:    Right Left  Biceps  2 2  Triceps 2 2  Brachiorad. 2 2  Patella  2 2  Achilles 2 2    Plantar response:  flexor bilaterally    Cerebellar testing:  no tremor apparent, finger/nose and maggie were intact    Gait: not assesed as she just returned from neuroimaging. Labs:     Lab Results   Component Value Date/Time    Hemoglobin A1c 5.5 02/05/2014 03:27 PM    Sodium 141 05/09/2020 02:32 AM    Potassium 3.5 05/09/2020 02:32 AM    Chloride 108 05/09/2020 02:32 AM    Glucose 135 (H) 05/09/2020 02:32 AM    BUN 16 05/09/2020 02:32 AM    Creatinine 1.20 (H) 05/09/2020 02:32 AM    Calcium 8.6 05/09/2020 02:32 AM    WBC 7.8 05/09/2020 02:32 AM    HCT 39.5 05/09/2020 02:32 AM    HGB 13.2 05/09/2020 02:32 AM    PLATELET 982 11/38/7298 02:32 AM       Imaging:    No results found for this or any previous visit. Results from Hospital Encounter encounter on 05/08/20   CT HEAD WO CONT    Narrative HEAD CT WITHOUT CONTRAST: 5/8/2020 8:19 PM    INDICATION: Altered mental status. COMPARISON: None. PROCEDURE: Axial images of the head were obtained without contrast. Coronal and  sagittal reformats were performed. CT dose reduction was achieved through use of  a standardized protocol tailored for this examination and automatic exposure  control for dose modulation. FINDINGS: The ventricles and sulci are appropriate in size and configuration for  age. No loss of gray-white differentiation to suggest late acute or early  subacute infarction. No mass effect or intracranial hemorrhage. Impression IMPRESSION: No acute intracranial abnormality. I did independently review the brain MRI from 5/9/2020. There is no acute stroke. There was dot-like diffusion restriction in the bilateral hippocampi which has been reported with transient global amnesia. The MRA revealed no flow-limiting stenosis.         Assessment and Plan:    The patient is a pleasant 76-year-old female with a transient episode of confusion and not remembering of the events from that period of time. She has returned back to her normal state. Probable transient global amnesia:  Precipitating factors may have been the recent stressors of the coronavirus pandemic and finding out that a sibling had the disease mixed with a sleeping medication. Imaging suggestive of TGA. Very unlikely to have been a seizure  MRI reveals no evidence of a stroke. No other additional neurological work-up would be completed or needed at this time. The patient can follow-up in the neurology clinic in 3 to 4 weeks for a follow-up visit. I discussed this all with the patient at length today.        Active Problems:    Elevated troponin (5/8/2020)               Signed By:  Juan Jose Peraza DO FAAN    May 9, 2020

## 2020-05-09 NOTE — PHYSICIAN ADVISORY
[facility] Physician Advisor Recommendation The information in this document is a recommendation to be used for utilization review and utilization management purposes only. This recommendation is not an order. The recommendation is made based on the information reviewed at the time of the referral, is pursuant to the [facility] Conditions of Participation (42 CFR Part 482), and is neither a judgment nor an assessment with regard to the appropriateness or quality of clinical care. Nothing in this document may be used to limit, alter, or affect clinical services provided to the patient named below. The provider of services is ultimately responsible for the submission of a claim that has met all requirements for correct coding, billing, and reimbursement. Letter of Status Determination: Current Status OBSERVATION is Appropriate Pt Name:  Malissa Hernandez  
MR#  [id number] [facility]#   [id number] Room and Hospital  2160/01  @ [facility] Hospitalization date  5/8/2020  5:50 PM  
Current Attending Physician  Nehal Avila MD  
Principal diagnosis  Change Mental Status Clinicals  78 y.o. female hospitalized with  
  
[facility] criteria Does  NOT apply STATUS DETERMINATION  On the basis of clinical data, available documentation, we believe that the current status of this patient as OBSERVATION is Appropriate Additional comments Insurance  [place]or: [place] / Plan: [place] MEDICARE PART A & B / Product Type: Medicare /   
 
No current facility-administered medications on file prior to encounter. Current Outpatient Medications on File Prior to Encounter Medication Sig Dispense Refill  levothyroxine (Synthroid) 125 mcg tablet Take 125 mcg by mouth Daily (before breakfast).  melatonin 1 mg tablet Take  by mouth.  cholecalciferol (VITAMIN D3) 400 unit tab tablet Take 400 Units by mouth daily.  ACETAMINOPHEN PO Take 650 mg by mouth every six (6) hours as needed. 3:31 PM 5/9/2020 Armond Schroeder M.D., [facility] Physician [address] [place] [address] C: [phone]

## 2020-05-09 NOTE — PROGRESS NOTES
Problem: Falls - Risk of  Goal: *Absence of Falls  Description: Document Shruthi Knight Fall Risk and appropriate interventions in the flowsheet. Outcome: Progressing Towards Goal  Note: Fall Risk Interventions:            Medication Interventions: Assess postural VS orthostatic hypotension, Evaluate medications/consider consulting pharmacy, Patient to call before getting OOB, Teach patient to arise slowly                   Problem: Patient Education: Go to Patient Education Activity  Goal: Patient/Family Education  Outcome: Progressing Towards Goal     Problem: Pain  Goal: *Control of Pain  Outcome: Progressing Towards Goal  Goal: *PALLIATIVE CARE:  Alleviation of Pain  Outcome: Progressing Towards Goal     Problem: Patient Education: Go to Patient Education Activity  Goal: Patient/Family Education  Outcome: Progressing Towards Goal     Problem: Pressure Injury - Risk of  Goal: *Prevention of pressure injury  Description: Document Eliceo Scale and appropriate interventions in the flowsheet.   Outcome: Progressing Towards Goal  Note: Pressure Injury Interventions:                                            Problem: Patient Education: Go to Patient Education Activity  Goal: Patient/Family Education  Outcome: Progressing Towards Goal     Problem: Patient Education: Go to Patient Education Activity  Goal: Patient/Family Education  Outcome: Progressing Towards Goal     Problem: TIA/CVA Stroke: 0-24 hours  Goal: Off Pathway (Use only if patient is Off Pathway)  Outcome: Progressing Towards Goal  Goal: Activity/Safety  Outcome: Progressing Towards Goal  Goal: Consults, if ordered  Outcome: Progressing Towards Goal  Goal: Diagnostic Test/Procedures  Outcome: Progressing Towards Goal  Goal: Nutrition/Diet  Outcome: Progressing Towards Goal  Goal: Discharge Planning  Outcome: Progressing Towards Goal  Goal: Medications  Outcome: Progressing Towards Goal  Goal: Respiratory  Outcome: Progressing Towards Goal  Goal: Treatments/Interventions/Procedures  Outcome: Progressing Towards Goal  Goal: Minimize risk of bleeding post-thrombolytic infusion  Outcome: Progressing Towards Goal  Goal: Monitor for complications post-thrombolytic infusion  Outcome: Progressing Towards Goal  Goal: Psychosocial  Outcome: Progressing Towards Goal  Goal: *Hemodynamically stable  Outcome: Progressing Towards Goal  Goal: *Neurologically stable  Description: Absence of additional neurological deficits    Outcome: Progressing Towards Goal  Goal: *Verbalizes anxiety and depression are reduced or absent  Outcome: Progressing Towards Goal  Goal: *Absence of Signs of Aspiration on Current Diet  Outcome: Progressing Towards Goal  Goal: *Absence of deep venous thrombosis signs and symptoms(Stroke Metric)  Outcome: Progressing Towards Goal  Goal: *Ability to perform ADLs and demonstrates progressive mobility and function  Outcome: Progressing Towards Goal  Goal: *Stroke education started(Stroke Metric)  Outcome: Progressing Towards Goal  Goal: *Dysphagia screen performed(Stroke Metric)  Outcome: Progressing Towards Goal  Goal: *Rehab consulted(Stroke Metric)  Outcome: Progressing Towards Goal

## 2020-05-09 NOTE — H&P
Hospitalist Admission Note    NAME: Cheryl Delay   :  1950   MRN:  [id number]     Date/Time:  2020 1:39 AM    Patient PCP: Yris Cassidy MD  ______________________________________________________________________  Given the patient's current clinical presentation, I have a high level of concern for decompensation if discharged from the emergency department. Complex decision making was performed, which includes reviewing the patient's available past medical records, laboratory results, and x-ray films. My assessment of this patient's clinical condition and my plan of care is as follows. Assessment / Plan:  Change mental status cause unknown rule out ischemic stroke  Admit patient for observation  Stroke work-up   Neurology consultation   Start patient on aspirin  PT OT evaluation    Elevated troponin rule out non-STEMI  Follow-up serial troponinechocardiogramcardiology consultation    Hypothyroidism  check TSH level  continue Synthyroid      Code Status: Full   Surrogate Decision Maker:    DVT Prophylaxis: Lovenox   GI Prophylaxis: not indicated          Subjective:   CHIEF COMPLAINT: change mental status     HISTORY OF PRESENT ILLNESS:     78years old female from home with past medical history significant for hypothyroidism, arthritis presented to the hospital for evaluation of change mental status started since yesterday, patient denies any focal weakness numbness any headache any blurry vision, blood work in ED was significant for elevated troponin 0.36, patient denies any fever chills and shortness of breath any chest pain. We were asked to admit for work up and evaluation of the above problems.      Past Medical History:   Diagnosis Date    Arthritis     Hypothyroidism     Osteoporosis     Thyroid disease         Past Surgical History:   Procedure Laterality Date    COLONOSCOPY N/A 2016    COLONOSCOPY performed by Sylvia Barker MD at [address]  7/27/2016         COLORECTAL SCRN; HI RISK IND  7/27/2016         HX APPENDECTOMY  1977    HX DILATION AND CURETTAGE  1999    HX GYN  1977    remove ovarian cyst    HX ORTHOPAEDIC  2/17/14    left hip    UPPER GI ENDOSCOPY,BIOPSY  2/22/2017            Social History     Tobacco Use    Smoking status: Never Smoker    Smokeless tobacco: Never Used   Substance Use Topics    Alcohol use: Yes     Alcohol/week: 1.7 standard drinks     Types: 2 Glasses of wine per week        Family History   Problem Relation Age of Onset   [facility] Alzheimer Mother     Cancer Mother 80        colon    Lung Disease Father     Cancer Maternal Grandmother 70        colon    Heart Disease Maternal Grandfather     Breast Cancer Maternal Aunt         late 70's     Allergies   Allergen Reactions    Codeine Nausea and Vomiting    Nsaids (Non-Steroidal Anti-Inflammatory Drug) Angioedema    Oxycodone Nausea and Vomiting    Percocet [Oxycodone-Acetaminophen] Nausea and Vomiting        Prior to Admission medications    Medication Sig Start Date End Date Taking? Authorizing Provider   omeprazole (PRILOSEC) 40 mg capsule Take 1 Cap by mouth daily. Indications: GASTROESOPHAGEAL REFLUX, gastritis 2/22/17   Ubaldo Ratliff MD   aspirin 81 mg chewable tablet Take 81 mg by mouth daily. Provider, Historical   melatonin 1 mg tablet Take  by mouth. Provider, Historical   levothyroxine (SYNTHROID) 200 mcg tablet Take 125 mcg by mouth Daily (before breakfast). Provider, Historical   cholecalciferol (VITAMIN D3) 400 unit tab tablet Take 400 Units by mouth daily. Provider, Historical   ACETAMINOPHEN PO Take 650 mg by mouth every six (6) hours as needed. Provider, Historical       REVIEW OF SYSTEMS:     I am not able to complete the review of systems because:    The patient is intubated and sedated    The patient has altered mental status due to his acute medical problems    The patient has baseline aphasia from prior stroke(s)    The patient has baseline dementia and is not reliable historian    The patient is in acute medical distress and unable to provide information           Total of 12 systems reviewed as follows:       POSITIVE= underlined text  Negative = text not underlined  General:  fever, chills, sweats, generalized weakness, weight loss/gain,      loss of appetite   Eyes:    blurred vision, eye pain, loss of vision, double vision  ENT:    rhinorrhea, pharyngitis   Respiratory:   cough, sputum production, SOB, SANTANA, wheezing, pleuritic pain   Cardiology:   chest pain, palpitations, orthopnea, PND, edema, syncope   Gastrointestinal:  abdominal pain , N/V, diarrhea, dysphagia, constipation, bleeding   Genitourinary:  frequency, urgency, dysuria, hematuria, incontinence   Muskuloskeletal :  arthralgia, myalgia, back pain  Hematology:  easy bruising, nose or gum bleeding, lymphadenopathy   Dermatological: rash, ulceration, pruritis, color change / jaundice  Endocrine:   hot flashes or polydipsia   Neurological:  headache, dizziness, confusion, focal weakness, paresthesia,     Speech difficulties, memory loss, gait difficulty  Psychological: Feelings of anxiety, depression, agitation    Objective:   VITALS:    Visit Vitals  /75   Pulse 100   Temp 98 °F (36.7 °C)   Resp 18   Ht 5' 6\" (1.676 m)   Wt 66.1 kg (145 lb 11.6 oz)   SpO2 95%   BMI 23.52 kg/m²       PHYSICAL EXAM:    General:    Alert, cooperative, no distress, appears stated age. HEENT: Atraumatic, anicteric sclerae, pink conjunctivae     No oral ulcers, mucosa moist, throat clear, dentition fair  Neck:  Supple, symmetrical,  thyroid: non tender  Lungs:   Clear to auscultation bilaterally. No Wheezing or Rhonchi. No rales. Chest wall:  No tenderness  No Accessory muscle use. Heart:   Regular  rhythm,  No  murmur   No edema  Abdomen:   Soft, non-tender. Not distended. Bowel sounds normal  Extremities: No cyanosis. No clubbing,      Skin turgor normal, Capillary refill normal, Radial dial pulse 2+  Skin:     Not pale. Not Jaundiced  No rashes   Psych:  Good insight. Not depressed. Not anxious or agitated. Neurologic: EOMs intact. No facial asymmetry. No aphasia or slurred speech. Symmetrical strength, Sensation grossly intact. Alert and oriented X 4.     _______________________________________________________________________  Care Plan discussed with:    Comments   Patient y    Family      RN y    Care Manager                    Consultant:      _______________________________________________________________________  Expected  Disposition:   Home with Family y   HH/PT/OT/RN    SNF/LTC    REMY    ________________________________________________________________________  TOTAL TIME:  61  Minutes    Critical Care Provided     Minutes non procedure based      Comments    y Reviewed previous records   >50% of visit spent in counseling and coordination of care y Discussion with patient and/or family and questions answered       ________________________________________________________________________  Signed: Lenore Vega MD    Procedures: see electronic medical records for all procedures/Xrays and details which were not copied into this note but were reviewed prior to creation of Plan.     LAB DATA REVIEWED:    Recent Results (from the past 24 hour(s))   CBC WITH AUTOMATED DIFF    Collection Time: 05/08/20  3:53 PM   Result Value Ref Range    WBC 9.5 3.6 - 11.0 K/uL    RBC 4.81 3.80 - 5.20 M/uL    HGB 14.0 11.5 - 16.0 g/dL    HCT 41.6 35.0 - 47.0 %    MCV 86.5 80.0 - 99.0 FL    MCH 29.1 26.0 - 34.0 PG    MCHC 33.7 30.0 - 36.5 g/dL    RDW 12.2 11.5 - 14.5 %    PLATELET 515 431 - 195 K/uL    MPV 10.5 8.9 - 12.9 FL    NRBC 0.0 0  WBC    ABSOLUTE NRBC 0.00 0.00 - 0.01 K/uL    NEUTROPHILS 84 (H) 32 - 75 %    LYMPHOCYTES 11 (L) 12 - 49 %    MONOCYTES 4 (L) 5 - 13 %    EOSINOPHILS 0 0 - 7 %    BASOPHILS 1 0 - 1 %    IMMATURE GRANULOCYTES 0 0.0 - 0.5 %    ABS. NEUTROPHILS 8.0 1.8 - 8.0 K/UL    ABS. LYMPHOCYTES 1.0 0.8 - 3.5 K/UL    ABS. MONOCYTES 0.4 0.0 - 1.0 K/UL    ABS. EOSINOPHILS 0.0 0.0 - 0.4 K/UL    ABS. BASOPHILS 0.1 0.0 - 0.1 K/UL    ABS. IMM. GRANS. 0.0 0.00 - 0.04 K/UL    DF AUTOMATED     METABOLIC PANEL, COMPREHENSIVE    Collection Time: 05/08/20  3:53 PM   Result Value Ref Range    Sodium 137 136 - 145 mmol/L    Potassium 3.9 3.5 - 5.1 mmol/L    Chloride 105 97 - 108 mmol/L    CO2 28 21 - 32 mmol/L    Anion gap 4 (L) 5 - 15 mmol/L    Glucose 115 (H) 65 - 100 mg/dL    BUN 16 6 - 20 MG/DL    Creatinine 1.01 0.55 - 1.02 MG/DL    BUN/Creatinine ratio 16 12 - 20      GFR est AA >60 >60 ml/min/1.73m2    GFR est non-AA 54 (L) >60 ml/min/1.73m2    Calcium 9.1 8.5 - 10.1 MG/DL    Bilirubin, total 0.5 0.2 - 1.0 MG/DL    ALT (SGPT) 16 12 - 78 U/L    AST (SGOT) 16 15 - 37 U/L    Alk.  phosphatase 95 45 - 117 U/L    Protein, total 8.1 6.4 - 8.2 g/dL    Albumin 4.0 3.5 - 5.0 g/dL    Globulin 4.1 (H) 2.0 - 4.0 g/dL    A-G Ratio 1.0 (L) 1.1 - 2.2     URINALYSIS W/ REFLEX CULTURE    Collection Time: 05/08/20  4:39 PM   Result Value Ref Range    Color YELLOW/STRAW      Appearance CLEAR CLEAR      Specific gravity 1.006 1.003 - 1.030      pH (UA) 6.5 5.0 - 8.0      Protein Negative NEG mg/dL    Glucose Negative NEG mg/dL    Ketone Negative NEG mg/dL    Bilirubin Negative NEG      Blood Negative NEG      Urobilinogen 0.2 0.2 - 1.0 EU/dL    Nitrites Negative NEG      Leukocyte Esterase MODERATE (A) NEG      WBC 10-20 0 - 4 /hpf    RBC 0-5 0 - 5 /hpf    Epithelial cells FEW FEW /lpf    Bacteria Negative NEG /hpf    UA:UC IF INDICATED URINE CULTURE ORDERED (A) CNI      Hyaline cast 0-2 0 - 5 /lpf   TROPONIN I    Collection Time: 05/08/20  7:42 PM   Result Value Ref Range    Troponin-I, Qt. 0.36 (H) <0.05 ng/mL   AMMONIA    Collection Time: 05/08/20  7:42 PM   Result Value Ref Range    Ammonia 15 <32 UMOL/L   EKG, 12 LEAD, INITIAL    Collection Time: 05/08/20  9:46 PM   Result Value Ref Range    Ventricular Rate 76 BPM    Atrial Rate 76 BPM    P-R Interval 144 ms    QRS Duration 74 ms    Q-T Interval 398 ms    QTC Calculation (Bezet) 447 ms    Calculated P Axis 54 degrees    Calculated R Axis -15 degrees    Calculated T Axis -20 degrees    Diagnosis       Normal sinus rhythm  Normal ECG  When compared with ECG of 05-FEB-2014 14:37,  Nonspecific T wave abnormality no longer evident in Anterolateral leads     TROPONIN I    Collection Time: 05/08/20  9:57 PM   Result Value Ref Range    Troponin-I, Qt. 0.37 (H) <0.05 ng/mL

## 2020-05-09 NOTE — PROGRESS NOTES
TRANSFER - IN REPORT:    Verbal report received from Hermelindo Mcmanus RN (name) on Becca Keith  being received from ED (unit) for routine progression of care      Report consisted of patients Situation, Background, Assessment and   Recommendations(SBAR). Information from the following report(s) SBAR, Kardex, ED Summary, Intake/Output, MAR, Accordion, Recent Results, Med Rec Status, Cardiac Rhythm NSR, Alarm Parameters , Pre Procedure Checklist, Procedure Verification, Quality Measures and Dual Neuro Assessment was reviewed with the receiving nurse. Opportunity for questions and clarification was provided. Assessment completed upon patients arrival to unit and care assumed.

## 2020-05-09 NOTE — PROGRESS NOTES
Pt. Arrived to [facility] from ED. Pt. A&Ox4, asymptomatic, stable vital signs.       Primary Nurse Kim Neal and Kim Huertas, RN performed a dual skin assessment on this patient No impairment noted  Eliceo score is 23

## 2020-05-09 NOTE — CONSULTS
[address] [facility]     Date of  Admission: 5/8/2020  5:50 PM     Admission type:Emergency    Consult for:  Consult by: Subjective: Norma Lima is a 78 y.o. female admitted for Elevated troponin [R79.89]. Admitted with Transient Global Amnesia. Mild trop elevation without EKG changes or cardiac Sx. No sig risk factors except mild elevation of chol. MRI shows mild abnormalities in hippocampi c/w TGA      Patient Active Problem List    Diagnosis Date Noted    Elevated troponin 05/08/2020    Cholelithiasis without cholecystitis 12/12/2016    Abnormal cytology 04/21/2014    HPV in female 04/21/2014    Normocytic anemia 02/18/2014    Arthropathy of hip 02/17/2014    Hypothyroidism     Osteoporosis     Arthritis       George Coughlin MD  Past Medical History:   Diagnosis Date    Arthritis     Hypothyroidism     Osteoporosis     Thyroid disease       Social History     Socioeconomic History    Marital status:      Spouse name: Not on file    Number of children: Not on file    Years of education: Not on file    Highest education level: Not on file   Tobacco Use    Smoking status: Never Smoker    Smokeless tobacco: Never Used   Substance and Sexual Activity    Alcohol use:  Yes     Alcohol/week: 1.7 standard drinks     Types: 2 Glasses of wine per week    Drug use: No     Allergies   Allergen Reactions    Codeine Nausea and Vomiting    Nsaids (Non-Steroidal Anti-Inflammatory Drug) Angioedema    Oxycodone Nausea and Vomiting    Percocet [Oxycodone-Acetaminophen] Nausea and Vomiting      Family History   Problem Relation Age of Onset    Alzheimer Mother     Cancer Mother 80        colon    Lung Disease Father     Cancer Maternal Grandmother 70        colon    Heart Disease Maternal Grandfather     Breast Cancer Maternal Aunt         late 70's      Current Facility-Administered Medications   Medication Dose Route Frequency    sodium chloride (NS) flush 5-40 mL  5-40 mL IntraVENous Q8H    sodium chloride (NS) flush 5-40 mL  5-40 mL IntraVENous PRN    enoxaparin (LOVENOX) injection 40 mg  40 mg SubCUTAneous Q24H    levothyroxine (SYNTHROID) tablet 125 mcg  125 mcg Oral ACB    melatonin tablet 1.5 mg  1.5 mg Oral QHS    cholecalciferol (VITAMIN D3) (400 Units /10 mcg) tablet 1 Tab  400 Units Oral DAILY    aspirin delayed-release tablet 81 mg  81 mg Oral DAILY    acetaminophen (TYLENOL) tablet 650 mg  650 mg Oral Q6H PRN    ondansetron (ZOFRAN) injection 4 mg  4 mg IntraVENous Q4H PRN        Review of Symptoms:   11 systems reviewed, negative other than as stated in the HPI        Objective:      Visit Vitals  /83 (BP 1 Location: Left arm, BP Patient Position: At rest)   Pulse 80   Temp 97.9 °F (36.6 °C)   Resp 18   Ht 5' 6\" (1.676 m)   Wt 145 lb 11.6 oz (66.1 kg)   SpO2 100%   BMI 23.52 kg/m²       Physical:   General:   Heart: RRR, no m/S3/JVD, no carotid bruits   Lungs: clear   Abdomen: Soft, +BS, NTND   Extremities: LE sulma +DP/PT, no edema   Neurologic: Grossly normal    Data Review:   Recent Labs     05/09/20  0232 05/08/20  1553   WBC 7.8 9.5   HGB 13.2 14.0   HCT 39.5 41.6    298     Recent Labs     05/09/20  0232 05/08/20  1553    137   K 3.5 3.9    105   CO2 26 28   * 115*   BUN 16 16   CREA 1.20* 1.01   CA 8.6 9.1   ALB  --  4.0   TBILI  --  0.5   SGOT  --  16   ALT  --  16       No results for input(s): CPK, CKMB in the last 72 hours. No lab exists for component: TROP      Intake/Output Summary (Last 24 hours) at 5/9/2020 1247  Last data filed at 5/9/2020 0308  Gross per 24 hour   Intake 100 ml   Output 350 ml   Net -250 ml        Cardiographics    Telemetry:   ECG:   Echocardiogram:   CXRAY:       Assessment:       Active Problems:    Elevated troponin (5/8/2020)         Plan:     Likely related to neurological event that caused her transient global amnesia.   If Neuro agrees, could be discharged. If not, keep for lexiscan on Monday.     Winston Oates MD

## 2020-05-09 NOTE — PROGRESS NOTES
Bedside shift change report given to Eloisa Luna RN (oncoming nurse) by Kaylah Velásquez RN (offgoing nurse). Report included the following information SBAR, Kardex, ED Summary, Procedure Summary, Intake/Output, MAR, Accordion, Recent Results, Med Rec Status, Cardiac Rhythm NSR, Alarm Parameters , Pre Procedure Checklist, Procedure Verification, Quality Measures and Dual Neuro Assessment.

## 2020-05-09 NOTE — DISCHARGE INSTRUCTIONS
HOSPITALIST DISCHARGE INSTRUCTIONS    NAME: Sisi Diamond   :  1950   MRN:  [id number]     Date/Time:  2020 4:17 PM    ADMIT DATE: 2020     DISCHARGE DATE: 2020     DISCHARGE DIAGNOSIS:  Transient Global Amnesia POA- now resolved, back to baseline, MRI brain consistent with it  Elevated Troponins POA- trended down, likely insignificant, OP stress test recommended , cleared by Cardiology Dr Leigh Ann Prieto  Hypothyroidism  Full code  Active Problems:    Elevated troponin (2020)         MEDICATIONS:  As per medication reconciliation  list  · It is important that you take the medication exactly as they are prescribed. · Keep your medication in the bottles provided by the pharmacist and keep a list of the medication names, dosages, and times to be taken in your wallet. · Do not take other medications without consulting your doctor. Pain Management: per above medications    What to do at Home    Recommended diet:  Regular Diet    Recommended activity: Activity as tolerated    If you have questions regarding the hospital related prescriptions or hospital related issues please call [facility] hubert Cobos at [phone]. If you experience any of the following symptoms then please call your primary care physician or return to the emergency room if you cannot get hold of your doctor:  Fever, chills, nausea, vomiting, diarrhea, change in mentation, falling, bleeding, shortness of breath,     Follow Up:  Dr. Sharad Meneses MD  you are to call and set up an appointment to see them in 7-10 days. Information obtained by :  I understand that if any problems occur once I am at home I am to contact my physician. I understand and acknowledge receipt of the instructions indicated above.                                                                                                                                            Physician's or R.N.'s Signature Date/Time                                                                                                                                              Patient or Representative Signature                                                          Date/Time

## 2020-05-09 NOTE — PROGRESS NOTES
PT note:     Orders received and acknowledged. Chart reviewed and cleared by nursing. Nursing reports patient is up ad millie. Spoke with patient. Patient reports she is at her baseline and feels normal. Will sign off.      Elbert Collier, PT, DPT

## 2020-05-09 NOTE — ED NOTES
TRANSFER - OUT REPORT:    Verbal report given to [facility] RN(name) on Sunny Victoria  being transferred to [facility] (unit) for routine progression of care       Report consisted of patients Situation, Background, Assessment and   Recommendations(SBAR). Information from the following report(s) SBAR, ED Summary, [facility] and Recent Results was reviewed with the receiving nurse. Lines:   Peripheral IV 05/08/20 Left Antecubital (Active)   Site Assessment Clean, dry, & intact 5/8/2020  7:15 PM   Phlebitis Assessment 0 5/8/2020  7:15 PM   Infiltration Assessment 0 5/8/2020  7:15 PM   Dressing Status Clean, dry, & intact 5/8/2020  7:15 PM   Dressing Type Transparent 5/8/2020  7:15 PM   Hub Color/Line Status Pink 5/8/2020  7:15 PM        Opportunity for questions and clarification was provided.

## 2020-05-09 NOTE — PROGRESS NOTES
Occupational Therapy  Medical record reviewed. Nursing reports that pt is up ad millie and has no OT needs. Pt reports that she has no concerns re: going home. She lives alone but has family members to assist her. She declines the need for OT at this time.

## 2020-05-09 NOTE — PROGRESS NOTES
Occupational Therapy  Orders received and acknowledged. Chart reviewed and noted patient on bedrest and awaiting cardiology consult. Will defer and follow up for OT evaluation.

## 2020-05-09 NOTE — PROGRESS NOTES
PT note:     Orders received and acknowledged. Chart reviewed and noted patient on bedrest and awaiting cardiology consult. Will defer and follow up for PT evaluation.      Praneeth Orozco, PT, DPT

## 2020-05-10 LAB
AV VELOCITY RATIO: 0.82
ECHO AO ROOT DIAM: 2.61 CM
ECHO AV AREA PEAK VELOCITY: 2.5 CM2
ECHO AV AREA/BSA PEAK VELOCITY: 1.4 CM2/M2
ECHO AV PEAK GRADIENT: 6.6 MMHG
ECHO AV PEAK VELOCITY: 128.2 CM/S
ECHO LA AREA 4C: 13.6 CM2
ECHO LA MAJOR AXIS: 2.94 CM
ECHO LA TO AORTIC ROOT RATIO: 1.13
ECHO LA VOL 2C: 38.67 ML (ref 22–52)
ECHO LA VOL 4C: 28.07 ML (ref 22–52)
ECHO LA VOL BP: 40.99 ML (ref 22–52)
ECHO LA VOL/BSA BIPLANE: 23.45 ML/M2 (ref 16–28)
ECHO LA VOLUME INDEX A2C: 22.12 ML/M2 (ref 16–28)
ECHO LA VOLUME INDEX A4C: 16.06 ML/M2 (ref 16–28)
ECHO LV E' LATERAL VELOCITY: 7.84 CM/S
ECHO LV E' SEPTAL VELOCITY: 6.15 CM/S
ECHO LV EDV TEICHHOLZ: 56.05 ML
ECHO LV ESV TEICHHOLZ: 18.14 ML
ECHO LV INTERNAL DIMENSION DIASTOLIC: 4.62 CM (ref 3.9–5.3)
ECHO LV INTERNAL DIMENSION SYSTOLIC: 2.89 CM
ECHO LV IVSD: 1.07 CM (ref 0.6–0.9)
ECHO LV IVSS: 1.54 CM
ECHO LV MASS 2D: 206.6 G (ref 67–162)
ECHO LV MASS INDEX 2D: 118.2 G/M2 (ref 43–95)
ECHO LV POSTERIOR WALL DIASTOLIC: 1.08 CM (ref 0.6–0.9)
ECHO LV POSTERIOR WALL SYSTOLIC: 1.49 CM
ECHO LVOT DIAM: 1.98 CM
ECHO LVOT PEAK GRADIENT: 4.4 MMHG
ECHO LVOT PEAK VELOCITY: 105.32 CM/S
ECHO MV A VELOCITY: 69.98 CM/S
ECHO MV E DECELERATION TIME (DT): 234.8 MS
ECHO MV E VELOCITY: 50.29 CM/S
ECHO MV E/A RATIO: 0.72
ECHO MV E/E' LATERAL: 6.41
ECHO MV E/E' RATIO (AVERAGED): 7.3
ECHO MV E/E' SEPTAL: 8.18
ECHO PV MAX VELOCITY: 96.45 CM/S
ECHO PV PEAK GRADIENT: 3.7 MMHG
ECHO RV INTERNAL DIMENSION: 2.88 CM
ECHO TV REGURGITANT MAX VELOCITY: 207.62 CM/S
ECHO TV REGURGITANT PEAK GRADIENT: 17.2 MMHG
LVFS 2D: 37.55 %
LVSV (TEICH): 37.91 ML
MV DEC SLOPE: 2.14

## 2020-05-11 ENCOUNTER — PATIENT OUTREACH (OUTPATIENT)
Dept: INTERNAL MEDICINE CLINIC | Age: 70
End: 2020-05-11

## 2020-05-11 DIAGNOSIS — Z82.49 FAMILY HISTORY OF EARLY CAD: ICD-10-CM

## 2020-05-11 DIAGNOSIS — E78.2 MIXED HYPERLIPIDEMIA: ICD-10-CM

## 2020-05-11 DIAGNOSIS — R77.8 ELEVATED TROPONIN: Primary | ICD-10-CM

## 2020-05-11 DIAGNOSIS — G45.4 TGA (TRANSIENT GLOBAL AMNESIA): ICD-10-CM

## 2020-05-11 LAB
BACTERIA SPEC CULT: ABNORMAL
CC UR VC: ABNORMAL
SERVICE CMNT-IMP: ABNORMAL

## 2020-05-11 RX ORDER — ACETAMINOPHEN 500 MG
TABLET ORAL
COMMUNITY

## 2020-05-11 RX ORDER — CHOLECALCIFEROL (VITAMIN D3) 125 MCG
CAPSULE ORAL
COMMUNITY

## 2020-05-11 NOTE — PROGRESS NOTES
Hospital Discharge Follow-Up      Date/Time:  2020 9:27 AM  Abdullahi Treadwell RN  Care Transitions Nurse  [phone]      Patient was admitted to [facility] on 2020 and discharged on 2020 for elevated troponin ? 2/2 transient global amnesia. The physician discharge summary was available at the time of outreach. Patient was contacted within one business days of discharge. Top Challenges reviewed with the provider   Neuro f/u 3-4 weeks  Op stress test-waiting for return call from cardiology  Advance Care Planning:   Does patient have an Advance Directive:  not on file        Method of communication with provider : none    Was this a readmission? no     Care Transition Nurse (CTN) contacted the patient by telephone to perform post hospital discharge assessment. Verified name and  with patient as identifiers. Provided introduction to self, and explanation of the CTN role. Patient reports she is doing pretty good. Reports daughter is here from [place] to offer support. Reports her brother, who was diagnosed with COVID-19 had a mild case and will be discharged either today or tomorrow. Patient appreciated the follow up call. Patient received hospital discharge instructions. CTN reviewed discharge instructions and red flags with patient who verbalized understanding. Patient given an opportunity to ask questions and does not have any further questions or concerns at this time. The patient agrees to contact the PCP office for questions related to their healthcare. CTN provided contact information for future reference.     Disease Specific:   N/A    Patients top risk factors for readmission:  None at this time    Home Health orders at discharge: none      Durable Medical Equipment ordered at discharge: none      Medication(s):   New Medications at Discharge: NA  Changed Medications at Discharge: NA  Discontinued Medications at Discharge: NA    Medication reconciliation was performed with patient, who verbalizes understanding of administration of home medications. There were no barriers to obtaining medications identified at this time. Referral to Pharm D needed: no     Current Outpatient Medications   Medication Sig    cranberry conc/ascorbic acid (CRANBERRY PLUS VITAMIN C PO) Take  by mouth.  cholecalciferol, vitamin D3, (Vitamin D3) 50 mcg (2,000 unit) tab Take  by mouth.  acetaminophen (Tylenol Extra Strength) 500 mg tablet Take  by mouth every six (6) hours as needed for Pain.  levothyroxine (Synthroid) 125 mcg tablet Take 125 mcg by mouth Daily (before breakfast).  melatonin 1 mg tablet Take 1 mg by mouth as needed. No current facility-administered medications for this visit. Medications Discontinued During This Encounter   Medication Reason    cholecalciferol (VITAMIN D3) 400 unit tab tablet Not A Current Medication    ACETAMINOPHEN PO Not A Current Medication       [facility] follow up appointment(s): No future appointments. Non-[facility] follow up appointment(s): Pt contacted Dr. Eder Sanchez office, cardiology to schedule OP stress test, waiting for return call  [facility]:  n/a       Goals      Attend follow up appointments on schedule        05/11/20  · Waiting to hear back from Dr. Adele Spence office for stress test appt  · Plans to contact neurology to schedule 3-4 week f/u appt           Education provided regarding COVID-19 infection prevention, and signs and symptoms of COVID-19 and when to seek medical attention with patient who verbalized understanding. Discussed exposure protocols and quarantine from [address] you at higher risk for severe illness 2019 and given an opportunity for questions and concerns. From CDC: Are you at higher risk for severe illness?  Wash your hands often.  Avoid close contact (6 feet, which is about two arm lengths) with people who are sick.    Put distance between yourself and other people if COVID-19 is spreading in your community.  Clean and disinfect frequently touched surfaces.  Avoid all cruise travel and non-essential air travel.  Call your healthcare professional if you have concerns about COVID-19 and your underlying condition or if you are sick.

## 2020-05-13 ENCOUNTER — TELEPHONE (OUTPATIENT)
Dept: CARDIOLOGY CLINIC | Age: 70
End: 2020-05-13

## 2020-05-18 ENCOUNTER — TELEPHONE (OUTPATIENT)
Dept: CARDIOLOGY CLINIC | Age: 70
End: 2020-05-18

## 2020-05-18 ENCOUNTER — PATIENT OUTREACH (OUTPATIENT)
Dept: INTERNAL MEDICINE CLINIC | Age: 70
End: 2020-05-18

## 2020-05-18 RX ORDER — CHOLECALCIFEROL (VITAMIN D3) 50 MCG
CAPSULE ORAL
COMMUNITY

## 2020-05-18 NOTE — TELEPHONE ENCOUNTER
Spoke with patient. Verified patient with two patient identifiers. Advised EST normal.  FU with PCP or us prn. Patient verbalized understanding.

## 2020-05-18 NOTE — TELEPHONE ENCOUNTER
----- Message from Cheryl Madrid NP sent at 5/15/2020  3:54 PM EDT -----  Please let the patient know her stress test did not show evidence of blood flow impairment. She can f/u with her PCP and our practice prn.

## 2020-06-01 ENCOUNTER — PATIENT OUTREACH (OUTPATIENT)
Dept: INTERNAL MEDICINE CLINIC | Age: 70
End: 2020-06-01

## 2022-01-28 NOTE — TELEPHONE ENCOUNTER
Called patient to confirm Nuclear stress test for 05/14. Reviewed with patient the need to hold all caffeine containing food, beverages and medicines for 24 hours. Pt verbalized understanding. Stable.

## 2022-02-07 ENCOUNTER — HOSPITAL ENCOUNTER (OUTPATIENT)
Dept: PREADMISSION TESTING | Age: 72
Discharge: HOME | End: 2022-02-07
Payer: MEDICARE

## 2022-02-07 LAB
SARS-COV-2, XPLCVT: NOT DETECTED
SOURCE, COVRS: NORMAL

## 2022-02-07 PROCEDURE — U0005 INFEC AGEN DETEC AMPLI PROBE: HCPCS

## 2022-02-11 ENCOUNTER — HOSPITAL ENCOUNTER (OUTPATIENT)
Age: 72
Setting detail: OUTPATIENT SURGERY
Discharge: HOME | End: 2022-02-11
Attending: INTERNAL MEDICINE | Admitting: INTERNAL MEDICINE
Payer: MEDICARE

## 2022-02-11 ENCOUNTER — ANESTHESIA EVENT (OUTPATIENT)
Dept: ENDOSCOPY | Age: 72
End: 2022-02-11
Payer: MEDICARE

## 2022-02-11 ENCOUNTER — ANESTHESIA (OUTPATIENT)
Dept: ENDOSCOPY | Age: 72
End: 2022-02-11
Payer: MEDICARE

## 2022-02-11 VITALS
DIASTOLIC BLOOD PRESSURE: 65 MMHG | RESPIRATION RATE: 18 BRPM | HEIGHT: 65 IN | TEMPERATURE: 96.8 F | WEIGHT: 144.4 LBS | SYSTOLIC BLOOD PRESSURE: 122 MMHG | BODY MASS INDEX: 24.06 KG/M2 | OXYGEN SATURATION: 99 % | HEART RATE: 66 BPM

## 2022-02-11 PROCEDURE — 76060000031 HC ANESTHESIA FIRST 0.5 HR: Performed by: INTERNAL MEDICINE

## 2022-02-11 PROCEDURE — 2709999900 HC NON-CHARGEABLE SUPPLY: Performed by: INTERNAL MEDICINE

## 2022-02-11 PROCEDURE — 74011250636 HC RX REV CODE- 250/636: Performed by: INTERNAL MEDICINE

## 2022-02-11 PROCEDURE — 76040000019: Performed by: INTERNAL MEDICINE

## 2022-02-11 PROCEDURE — 74011250636 HC RX REV CODE- 250/636: Performed by: REGISTERED NURSE

## 2022-02-11 PROCEDURE — 74011000250 HC RX REV CODE- 250: Performed by: REGISTERED NURSE

## 2022-02-11 RX ORDER — MIDAZOLAM HYDROCHLORIDE 1 MG/ML
.25-5 INJECTION, SOLUTION INTRAMUSCULAR; INTRAVENOUS
Status: DISCONTINUED | OUTPATIENT
Start: 2022-02-11 | End: 2022-02-11 | Stop reason: HOSPADM

## 2022-02-11 RX ORDER — DEXTROMETHORPHAN/PSEUDOEPHED 2.5-7.5/.8
1.2 DROPS ORAL
Status: DISCONTINUED | OUTPATIENT
Start: 2022-02-11 | End: 2022-02-11 | Stop reason: HOSPADM

## 2022-02-11 RX ORDER — SODIUM CHLORIDE 0.9 % (FLUSH) 0.9 %
5-40 SYRINGE (ML) INJECTION AS NEEDED
Status: DISCONTINUED | OUTPATIENT
Start: 2022-02-11 | End: 2022-02-11 | Stop reason: HOSPADM

## 2022-02-11 RX ORDER — LIDOCAINE HYDROCHLORIDE 20 MG/ML
INJECTION, SOLUTION EPIDURAL; INFILTRATION; INTRACAUDAL; PERINEURAL AS NEEDED
Status: DISCONTINUED | OUTPATIENT
Start: 2022-02-11 | End: 2022-02-11 | Stop reason: HOSPADM

## 2022-02-11 RX ORDER — ATROPINE SULFATE 0.1 MG/ML
0.5 INJECTION INTRAVENOUS
Status: DISCONTINUED | OUTPATIENT
Start: 2022-02-11 | End: 2022-02-11 | Stop reason: HOSPADM

## 2022-02-11 RX ORDER — PROPOFOL 10 MG/ML
INJECTION, EMULSION INTRAVENOUS AS NEEDED
Status: DISCONTINUED | OUTPATIENT
Start: 2022-02-11 | End: 2022-02-11 | Stop reason: HOSPADM

## 2022-02-11 RX ORDER — NALOXONE HYDROCHLORIDE 0.4 MG/ML
0.4 INJECTION, SOLUTION INTRAMUSCULAR; INTRAVENOUS; SUBCUTANEOUS
Status: DISCONTINUED | OUTPATIENT
Start: 2022-02-11 | End: 2022-02-11 | Stop reason: HOSPADM

## 2022-02-11 RX ORDER — SODIUM CHLORIDE 0.9 % (FLUSH) 0.9 %
5-40 SYRINGE (ML) INJECTION EVERY 8 HOURS
Status: DISCONTINUED | OUTPATIENT
Start: 2022-02-11 | End: 2022-02-11 | Stop reason: HOSPADM

## 2022-02-11 RX ORDER — FLUMAZENIL 0.1 MG/ML
0.2 INJECTION INTRAVENOUS
Status: DISCONTINUED | OUTPATIENT
Start: 2022-02-11 | End: 2022-02-11 | Stop reason: HOSPADM

## 2022-02-11 RX ORDER — SODIUM CHLORIDE 9 MG/ML
100 INJECTION, SOLUTION INTRAVENOUS CONTINUOUS
Status: DISCONTINUED | OUTPATIENT
Start: 2022-02-11 | End: 2022-02-11 | Stop reason: HOSPADM

## 2022-02-11 RX ORDER — EPINEPHRINE 0.1 MG/ML
1 INJECTION INTRACARDIAC; INTRAVENOUS
Status: DISCONTINUED | OUTPATIENT
Start: 2022-02-11 | End: 2022-02-11 | Stop reason: HOSPADM

## 2022-02-11 RX ADMIN — PROPOFOL 30 MG: 10 INJECTION, EMULSION INTRAVENOUS at 08:01

## 2022-02-11 RX ADMIN — LIDOCAINE HYDROCHLORIDE 40 MG: 20 INJECTION, SOLUTION EPIDURAL; INFILTRATION; INTRACAUDAL; PERINEURAL at 07:58

## 2022-02-11 RX ADMIN — PROPOFOL 40 MG: 10 INJECTION, EMULSION INTRAVENOUS at 08:14

## 2022-02-11 RX ADMIN — PROPOFOL 40 MG: 10 INJECTION, EMULSION INTRAVENOUS at 08:10

## 2022-02-11 RX ADMIN — SODIUM CHLORIDE 100 ML/HR: 9 INJECTION, SOLUTION INTRAVENOUS at 07:52

## 2022-02-11 RX ADMIN — PROPOFOL 20 MG: 10 INJECTION, EMULSION INTRAVENOUS at 08:05

## 2022-02-11 RX ADMIN — PROPOFOL 30 MG: 10 INJECTION, EMULSION INTRAVENOUS at 08:04

## 2022-02-11 RX ADMIN — PROPOFOL 80 MG: 10 INJECTION, EMULSION INTRAVENOUS at 07:58

## 2022-02-11 NOTE — PROGRESS NOTES
819  Endoscope was pre-cleaned at bedside immediately following procedure by [place]. 821  Received report from anesthesia. Assumed pt care. VSS.

## 2022-02-11 NOTE — ROUTINE PROCESS
Amol Perryalyce  1950  [id number]    Situation:  Verbal report received from: TAE Dyer RN  Procedure: Procedure(s):  COLONOSCOPY    Background:    Preoperative diagnosis: FAMILY HISTORY COLON CANCER, PERSONAL HISTORY OF COLON POLYPS  Postoperative diagnosis: family history of colon ca, hemorrhoids    :  Dr. Tommie Slaughter  Assistant(s): Endoscopy Technician-1: Hector Almanza  Endoscopy RN-1: Simon Dominique RN    Specimens: * No specimens in log *  H. Pylori  no    Assessment:  Intra-procedure medicati    Anesthesia gave intra-procedure sedation and medications, see anesthesia flow sheet yes    Intravenous fluids: NS@ KVO     Vital signs stable     Abdominal assessment: round and soft     Recommendation:  Discharge patient per MD order.   Family or Friend   Permission to share finding with family or friend yes

## 2022-02-11 NOTE — H&P
Willy Fountain MD  [facility], [address]  [phone]  [email or web address]    Gastroenterology Outpatient History and Physical    Patient: [place]    Physician: Jerl Siemens., MD    Vital Signs: Blood pressure (!) 184/89, pulse 85, temperature 98.5 °F (36.9 °C), resp. rate 15, height 5' 5\" (1.651 m), weight 65.5 kg (144 lb 6.4 oz), SpO2 100 %, not currently breastfeeding. Allergies: Allergies   Allergen Reactions    Codeine Nausea and Vomiting    Nsaids (Non-Steroidal Anti-Inflammatory Drug) Angioedema    Oxycodone Nausea and Vomiting    Percocet [Oxycodone-Acetaminophen] Nausea and Vomiting       Chief Complaint: Screening colonoscopy    History of Present Illness: Here for a screening colonoscopy. Last colonoscopy was 2016 and showed no polyps. Currently has no GI symptoms. Positive FH of colon cancer. History:  Past Medical History:   Diagnosis Date    Arthritis     Hypothyroidism     Osteoporosis     Thyroid disease       Past Surgical History:   Procedure Laterality Date    COLONOSCOPY N/A 7/27/2016    COLONOS[facility] Jerl Siemens., [facility] END[facility]COPY    COLONOSCOPY,DIAGNOSTIC  7/27/2016         COLORECTAL SCRN; HI RISK IND  7/27/2016         HX APPENDECTOMY  1977    HX DILATION AND CURETTAGE  1999    HX GYN  1977    remove ovarian cyst    HX ORTHOPAEDIC  2/17/14    left hip    UPPER GI ENDOSCOPY,BIOPSY  2/22/2017           Social History     Socioeconomic History    Marital status:    Tobacco Use    Smoking status: Never Smoker    Smokeless tobacco: Never Used   Substance and Sexual Activity    Alcohol use:  Yes     Alcohol/week: 1.7 standard drinks     Types: 2 Glasses of wine per week    Drug use: No      Family History   Problem Relation Age of Onset    Alzheimer's Disease Mother     Cancer Mother 80        colon    Lung Disease Father     Cancer Maternal Grandmother 70        colon    Heart Disease Maternal Grandfather     Breast Cancer Maternal Aunt         late 66's      Patient Active Problem List   Diagnosis Code    Hypothyroidism E03.9    Osteoporosis M81.0    Arthritis M19.90    Arthropathy of hip M16.10    Normocytic anemia D64.9    Abnormal cytology R89.6    HPV in female B80.11    Cholelithiasis without cholecystitis K80.20    Elevated troponin R77.8       Medications:   Prior to Admission medications    Medication Sig Start Date End Date Taking? Authorizing Provider   B.infantis-B.ani-B.long-B.bifi (Probiotic 4X) 10-15 mg TbEC Take  by mouth. Yes Provider, Historical   cranberry conc/ascorbic acid (CRANBERRY PLUS VITAMIN C PO) Take  by mouth. Yes Provider, Historical   cholecalciferol, vitamin D3, (Vitamin D3) 50 mcg (2,000 unit) tab Take  by mouth. Yes Provider, Historical   acetaminophen (Tylenol Extra Strength) 500 mg tablet Take  by mouth every six (6) hours as needed for Pain. Yes Provider, Historical   levothyroxine (Synthroid) 125 mcg tablet Take 125 mcg by mouth Daily (before breakfast). Yes Provider, Historical   melatonin 1 mg tablet Take 1 mg by mouth as needed. Yes Provider, Historical       Physical Exam:     General: well developed, well nourished   HEENT: unremarkable   Heart: regular rhythm no mumur    Lungs: clear   Abdominal:  benign   Neurological: unremarkable   Extremities: no edema     Findings/Diagnosis: Screening colonoscopy.  FH of colon cancer    Plan of Care/Planned Procedure: Colonoscopy with monitored anesthesia care sedation    Signed:  Yung Vivas MD 2/11/2022

## 2022-02-11 NOTE — ANESTHESIA POSTPROCEDURE EVALUATION
Procedure(s):  COLONOSCOPY.    total IV anesthesia, general    Anesthesia Post Evaluation        Patient location during evaluation: PACU  Note status: Adequate. Level of consciousness: responsive to verbal stimuli and sleepy but conscious  Pain management: satisfactory to patient  Airway patency: patent  Anesthetic complications: no  Cardiovascular status: acceptable  Respiratory status: acceptable  Hydration status: acceptable  Comments: +Post-Anesthesia Evaluation and Assessment    Patient: Shira Shanks MRN: [id number]  SSN: [id number]   YOB: 1950  Age: 66 y.o. Sex: female      Cardiovascular Function/Vital Signs    /60   Pulse 77   Temp 36.9 °C (98.5 °F)   Resp 14   Ht 5' 5\" (1.651 m)   Wt 65.5 kg (144 lb 6.4 oz)   SpO2 98%   Breastfeeding No   BMI 24.03 kg/m²     Patient is status post Procedure(s):  COLONOSCOPY. Nausea/Vomiting: Controlled. Postoperative hydration reviewed and adequate. Pain:  Pain Scale 1: Numeric (0 - 10) (02/11/22 1040)  Pain Intensity 1: 0 (02/11/22 6892)   Managed. Neurological Status: At baseline. Mental Status and Level of Consciousness: Arousable. Pulmonary Status:   O2 Device: None (02/11/22 6693)   Adequate oxygenation and airway patent. Complications related to anesthesia: None    Post-anesthesia assessment completed. No concerns.     Signed By: Jamal King MD    2/11/2022  Post anesthesia nausea and vomiting:  controlled      INITIAL Post-op Vital signs:   Vitals Value Taken Time   /60 02/11/22 0822   Temp     Pulse 77 02/11/22 0822   Resp 14 02/11/22 0822   SpO2 98 % 02/11/22 0822

## 2022-02-11 NOTE — DISCHARGE INSTRUCTIONS
Anali Lira MD  [facility], [address] [facility]  [place], [address] [facility]  [phone]  [email or web address]    Lucas Kimball  [id number]  1950    COLON DISCHARGE INSTRUCTIONS  Discomfort:  Redness at IV site- apply warm compress to area; if redness or soreness persist- contact your physician  There may be a slight amount of blood passed from the rectum  Gaseous discomfort- walking, belching will help relieve any discomfort  You may not operate a vehicle for 12 hours  You may not engage in an occupation involving machinery or appliances for rest of today  You may not drink alcoholic beverages for at least 12 hours  Avoid making any critical decisions for at least 24 hour  DIET:   High fiber diet. - however -  remember your colon is empty and a heavy meal will produce gas. Avoid these foods:  vegetables, fried / greasy foods, carbonated drinks for today      ACTIVITY:  You may resume your normal daily activities it is recommended that you spend the remainder of the day resting -  avoid any strenuous activity. CALL M.D. ANY SIGN OF:   Increasing pain, nausea, vomiting  Abdominal distension (swelling)  New increased bleeding (oral or rectal)  Fever (chills)  Pain in chest area  Bloody discharge from nose or mouth  Shortness of breath     COLONOSCOPY FINDINGS:  Your colonoscopy showed: no polyps found. Do have some internal hemorrhoids. Follow-up Instructions:   Call Dr. Anali Lira if any questions or problems. Telephone # [phone]    Should have a repeat colonoscopy in 5 years.

## 2022-02-11 NOTE — PROCEDURES
Roberto Windy                  Colonoscopy Operative Report    2/11/2022      Mita Velásquez  [id number]  1950    Procedure Type:   Colonoscopy --screening     Indications:    Family history of coloretal cancer (screening only)     Pre-operative Diagnosis: see indication above    Post-operative Diagnosis:  See findings below    :  Kimberlee Babinski, MD    Referring Provider: Calvin Odom MD      Sedation:  MAC anesthesia Propofol    Pre-Procedural Exam:      Airway: clear,  No airway problems anticipated  Heart: RRR, without gallops or rubs  Lungs: clear bilaterally without wheezes, crackles, or rhonchi  Abdomen: soft, nontender, nondistended, bowel sounds present  Mental Status: awake, alert and oriented to person, place and time     Procedure Details:  After informed consent was obtained with all risks and benefits of procedure explained and preoperative exam completed, the patient was taken to the endoscopy suite and placed in the left lateral decubitus position. Upon sequential sedation as per above, a digital rectal exam was performed . The Olympus videocolonoscope  was inserted in the rectum and carefully advanced to the cecum, which was identified by the ileocecal valve and appendiceal orifice. The cecum was identified by the ileocecal valve and appendiceal orifice. The quality of preparation was good. The colonoscope was slowly withdrawn with careful evaluation between folds. Retroflexion in the rectum was completed demonstrating internal hemorrhoids. Findings:   Rectum: Grade 1 internal hemorrhoid(s); Sigmoid: normal  Descending Colon: normal  Transverse Colon: normal  Ascending Colon: normal  Cecum: normal  Terminal Ileum: not intubated      Specimen Removed:  none    Complications: None. EBL:  None. Impression:    normal colonic mucosa throughout  hemorrhoids internal, Moderate in size    Recommendations: --Repeat colonoscopy in 5 years. High fiber diet. Resume normal medication(s). Discharge Disposition:  Home in the company of a  when able to ambulate. Yung Vivas MD    2/11/2022     ALESHA Frazier MD  [facility], [address] [place] [address] [address]  [phone]  [email or web address]

## 2022-03-18 PROBLEM — R79.89 ELEVATED TROPONIN: Status: ACTIVE | Noted: 2020-05-08

## 2022-03-18 PROBLEM — R77.8 ELEVATED TROPONIN: Status: ACTIVE | Noted: 2020-05-08

## 2023-05-12 RX ORDER — ACETAMINOPHEN 500 MG
TABLET ORAL EVERY 6 HOURS PRN
COMMUNITY

## 2023-05-12 RX ORDER — UREA 10 %
LOTION (ML) TOPICAL PRN
COMMUNITY

## 2023-05-12 RX ORDER — LEVOTHYROXINE SODIUM 0.12 MG/1
TABLET ORAL
COMMUNITY

## 2024-04-05 ASSESSMENT — SLEEP AND FATIGUE QUESTIONNAIRES
HOW LIKELY ARE YOU TO NOD OFF OR FALL ASLEEP WHILE WATCHING TV: SLIGHT CHANCE OF DOZING
HOW LIKELY ARE YOU TO NOD OFF OR FALL ASLEEP WHILE SITTING INACTIVE IN A PUBLIC PLACE: SLIGHT CHANCE OF DOZING
FOSQ SCORE: 17
HOW LIKELY ARE YOU TO NOD OFF OR FALL ASLEEP WHILE LYING DOWN TO REST IN THE AFTERNOON WHEN CIRCUMSTANCES PERMIT: MODERATE CHANCE OF DOZING
SELECT ANY OF THE FOLLOWING BEHAVIORS OBSERVED WHILE YOU ARE ASLEEP: LOUD SNORING
DO YOU HAVE DIFFICULTY OPERATING A MOTOR VEHICLE FOR SHORT DISTANCES (LESS THAN 100 MILES) BECAUSE YOU BECOME SLEEPY: NO
ESS TOTAL SCORE: 6
AVERAGE NUMBER OF SLEEP HOURS: 5
HOW LIKELY ARE YOU TO NOD OFF OR FALL ASLEEP WHILE WATCHING TV: SLIGHT CHANCE OF DOZING
DO YOU HAVE DIFFICULTY BEING AS ACTIVE AS YOU WANT TO BE IN THE EVENING BECAUSE YOU ARE SLEEPY OR TIRED: YES, LITTLE
HOW LIKELY ARE YOU TO NOD OFF OR FALL ASLEEP WHILE SITTING AND TALKING TO SOMEONE: WOULD NEVER DOZE
HOW LIKELY ARE YOU TO NOD OFF OR FALL ASLEEP WHILE SITTING QUIETLY AFTER LUNCH WITHOUT ALCOHOL: SLIGHT CHANCE OF DOZING
HOW LIKELY ARE YOU TO NOD OFF OR FALL ASLEEP WHILE SITTING AND TALKING TO SOMEONE: WOULD NEVER DOZE
HOW LONG DO YOU NAP: 10 TO 15 MINUTES
DO YOU HAVE DIFFICULTY OPERATING A MOTOR VEHICLE FOR LONG DISTANCES (GREATER THAN 100 MILES) BECAUSE YOU BECOME SLEEPY: NO
HAS YOUR MOOD BEEN AFFECTED BECAUSE YOU ARE SLEEPY OR TIRED: YES, LITTLE
SELECT ANY OF THE FOLLOWING BEHAVIORS OBSERVED WHILE PATIENT ASLEEP: LOUD SNORING;GRINDING TEETH
NECK CIRCUMFERENCE (INCHES): 13
AVERAGE NUMBER OF SLEEP HOURS: 5
DO YOU GENERALLY HAVE DIFFICULTY REMEMBERING THINGS BECAUSE YOU ARE SLEEPY OR TIRED: YES, A LITTLE
NUMBER OF TIMES YOU WAKE PER NIGHT: 2
DO YOU HAVE PROBLEMS WITH FREQUENT AWAKENINGS AT NIGHT: YES
HOW LIKELY ARE YOU TO NOD OFF OR FALL ASLEEP IN A CAR, WHILE STOPPED FOR A FEW MINUTES IN TRAFFIC: WOULD NEVER DOZE
ARE YOU BOTHERED BY WAKING UP TOO EARLY AND NOT BEING ABLE TO GET BACK TO SLEEP: YES
HOW LIKELY ARE YOU TO NOD OFF OR FALL ASLEEP WHILE SITTING QUIETLY AFTER LUNCH WITHOUT ALCOHOL: SLIGHT CHANCE OF DOZING
HOW LIKELY ARE YOU TO NOD OFF OR FALL ASLEEP IN A CAR, WHILE STOPPED FOR A FEW MINUTES IN TRAFFIC: WOULD NEVER DOZE
DO YOU HAVE DIFFICULTY CONCENTRATING ON THE THINGS YOU DO BECAUSE YOU ARE SLEEPY OR TIRED: YES, A LITTLE
HAS YOUR RELATIONSHIP WITH FAMILY, FRIENDS OR WORK COLLEAGUES BEEN AFFECTED BECAUSE YOU ARE SLEEPY OR TIRED: NO
HOW LIKELY ARE YOU TO NOD OFF OR FALL ASLEEP WHEN YOU ARE A PASSENGER IN A CAR FOR AN HOUR WITHOUT A BREAK: WOULD NEVER DOZE
HOW LIKELY ARE YOU TO NOD OFF OR FALL ASLEEP WHEN YOU ARE A PASSENGER IN A CAR FOR AN HOUR WITHOUT A BREAK: WOULD NEVER DOZE
DO YOU HAVE DIFFICULTY WATCHING A MOVIE OR VIDEO BECAUSE YOU BECOME SLEEPY OR TIRED: YES, A LITTLE
ARE YOU BOTHERED BY WAKING UP TOO EARLY AND NOT BEING ABLE TO GET BACK TO SLEEP: YES
DO YOU HAVE DIFFICULTY VISITING YOUR FAMILY OR FRIENDS IN THEIR HOME BECAUSE YOU BECOME SLEEPY OR TIRED: NO
HOW LIKELY ARE YOU TO NOD OFF OR FALL ASLEEP WHILE SITTING AND READING: SLIGHT CHANCE OF DOZING
DO YOU GET TOO LITTLE SLEEP AT NIGHT: YES
SELECT ANY OF THE FOLLOWING BEHAVIORS OBSERVED WHILE YOU ARE ASLEEP: GRINDING TEETH
HOW LIKELY ARE YOU TO NOD OFF OR FALL ASLEEP WHILE SITTING AND READING: SLIGHT CHANCE OF DOZING
WHAT TIME DO YOU USUALLY GO TO BED: 22:00
DO YOU WORK SHIFTS: NO
HOW MANY NAPS DO YOU TAKE PER WEEK: 3
DO YOU TAKE NAPS: YES
DO YOU GET TOO LITTLE SLEEP AT NIGHT: YES
DO YOU HAVE DIFFICULTY BEING AS ACTIVE AS YOU WANT TO BE IN THE MORNING BECAUSE YOU ARE SLEEPY OR TIRED: YES, LITTLE
HOW LIKELY ARE YOU TO NOD OFF OR FALL ASLEEP WHILE SITTING INACTIVE IN A PUBLIC PLACE: SLIGHT CHANCE OF DOZING
HOW LIKELY ARE YOU TO NOD OFF OR FALL ASLEEP WHILE LYING DOWN TO REST IN THE AFTERNOON WHEN CIRCUMSTANCES PERMIT: MODERATE CHANCE OF DOZING

## 2024-04-08 ENCOUNTER — OFFICE VISIT (OUTPATIENT)
Age: 74
End: 2024-04-08
Payer: MEDICARE

## 2024-04-08 VITALS
OXYGEN SATURATION: 96 % | BODY MASS INDEX: 24.83 KG/M2 | TEMPERATURE: 98 F | WEIGHT: 149 LBS | HEIGHT: 65 IN | HEART RATE: 81 BPM | DIASTOLIC BLOOD PRESSURE: 77 MMHG | SYSTOLIC BLOOD PRESSURE: 154 MMHG

## 2024-04-08 DIAGNOSIS — G47.33 OBSTRUCTIVE SLEEP APNEA (ADULT) (PEDIATRIC): Primary | ICD-10-CM

## 2024-04-08 DIAGNOSIS — G47.00 INSOMNIA, UNSPECIFIED TYPE: ICD-10-CM

## 2024-04-08 PROCEDURE — 1123F ACP DISCUSS/DSCN MKR DOCD: CPT | Performed by: INTERNAL MEDICINE

## 2024-04-08 PROCEDURE — 1090F PRES/ABSN URINE INCON ASSESS: CPT | Performed by: INTERNAL MEDICINE

## 2024-04-08 PROCEDURE — 99204 OFFICE O/P NEW MOD 45 MIN: CPT | Performed by: INTERNAL MEDICINE

## 2024-04-08 PROCEDURE — G8427 DOCREV CUR MEDS BY ELIG CLIN: HCPCS | Performed by: INTERNAL MEDICINE

## 2024-04-08 PROCEDURE — G8399 PT W/DXA RESULTS DOCUMENT: HCPCS | Performed by: INTERNAL MEDICINE

## 2024-04-08 PROCEDURE — G8420 CALC BMI NORM PARAMETERS: HCPCS | Performed by: INTERNAL MEDICINE

## 2024-04-08 PROCEDURE — 3017F COLORECTAL CA SCREEN DOC REV: CPT | Performed by: INTERNAL MEDICINE

## 2024-04-08 PROCEDURE — 1036F TOBACCO NON-USER: CPT | Performed by: INTERNAL MEDICINE

## 2024-04-08 NOTE — PROGRESS NOTES
(5' 5\")   Wt 67.6 kg (149 lb)   SpO2 96%   BMI 24.79 kg/m²       General:   Not in acute distress   Eyes:  Anicteric sclerae, no obvious strabismus   Nose:  No obvious nasal septum deviation    Oropharynx:   Class 3 oropharyngeal outlet, thick tongue base, low-lying soft palate,      Tonsils:   tonsils are present and normal   Neck:    ; 13 inches;midline trachea   Chest/Lungs:  Equal lung expansion, clear on auscultation    CVS:  Normal rate, regular rhythm; no JVD   Skin:  Warm to touch; no obvious rashes   Neuro:  No focal deficits ; no obvious tremor    Psych:  Normal affect,  normal countenance;          Assessment:      Diagnosis Orders   1. Obstructive sleep apnea (adult) (pediatric)  SLEEP STUDY FULL      2. Insomnia, unspecified type              Plan:     * The patient currently has a Moderate Risk for having sleep apnea.  STOP-BANG score 3.  * PSG was ordered for initial evaluation.  We will follow the American Academy of Sleep Medicine protocol regarding split-night procedures and offering a trial of Positive Airway Pressure (CPAP, BPAP, etc.)  * She was provided information on sleep apnea including coresponding risk factors and the importance of proper treatment.  * Counseling was provided regarding proper sleep hygiene and safe driving.  Treatment options for sleep apnea were reviewed.   she would like to proceed with a trial of PAP if found to have significant apnea.    2. Insomnia - I have advised a regular sleep wake cycle. she  was advised to avoid looking at the clock during the night.  Ideally, the clock face should be turned away.  she was advised to minimize caffeine use and to avoid caffeine-containing beverages 8 hours prior to bedtime.  Naps were discouraged.  A regular exercise schedule, at least 3 hours before bedtime, would be beneficial to improving sleep quality.    Watching TV, using laptops, tablets and smartphones in the evening was discouraged.  she  was advised to keep the

## 2024-04-08 NOTE — PATIENT INSTRUCTIONS
[address]  [place] [address]  Tel.  [phone]  Fax. [phone] [address]  [place] [address]  Tel.  [phone]  Fax. [phone] [address] [facility] [address] [place] [address]  Tel.  [phone]  Fax. [phone]     Sleep Apnea: After Your Visit  Your Care Instructions  Sleep apnea occurs when you frequently stop breathing for 10 seconds or longer during sleep. It can be mild to severe, based on the number of times per hour that you stop breathing or have slowed breathing. Blocked or narrowed airways in your nose, mouth, or throat can cause sleep apnea. Your airway can become blocked when your throat muscles and tongue relax during sleep.  Sleep apnea is common, occurring in 1 out of 20 individuals.  Individuals having any of the following characteristics should be evaluated and treated right away due to high risk and detrimental consequences from untreated sleep apnea:  Obesity  Congestive Heart failure  Atrial Fibrillation  Uncontrolled Hypertension  Type II Diabetes  Night-time Arrhythmias  Stroke  Pulmonary Hypertension  High-risk Driving Populations (pilots, truck drivers, etc.)  Patients Considering Weight-loss Surgery    How do you know you have sleep apnea?  You probably have sleep apnea if you answer 'yes' to 3 or more of the following questions:  S - Have you been told that you Snore?   T - Are you often Tired during the day?  O - Has anyone Observed you stop breathing while sleeping?  P- Do you have (or are being treated for) high blood Pressure?    B - Are you obese (Body Mass Index > 35)?  A - Is your Age 50 years old or older?  N - Is your Neck size greater than 16 inches?  G - Are you male Gender?  A sleep physician can prescribe a breathing device that prevents tissues in the throat from blocking your airway. Or your doctor may recommend using a dental device (oral breathing device) to help keep your airway open. In some cases, surgery may

## 2024-05-22 ENCOUNTER — CLINICAL DOCUMENTATION (OUTPATIENT)
Age: 74
End: 2024-05-22

## 2024-05-22 NOTE — PROGRESS NOTES
Results of sleep study in [employer]  Lead tech to convey results to patient      PSG was negative for significant apnea. AHI was 0.9/hour.  Therefore, PAP therapy is not indicated at this time.     Minimal snoring was noted.   she fell asleep in 34 minutes minutes. she appeared to be restless and had some difficulty sleeping in the lab. Sleep efficiency was 54% %.  All stages of sleep were seen.             she should ensure  a regular sleep wake cycle. she  should avoid looking at the clock during the night.  Ideally, the clock face should be turned away.  she should not look at her phone during the night. she should minimize caffeine use and to avoid caffeine-containing beverages 8 hours prior to bedtime.     A regular exercise schedule, at least 3 hours before bedtime, would be beneficial to improving sleep quality.  she should avoid evening light and to use sunglasses in the late afternoon.  Watching TV, using laptops, tablets and smartphones in the evening is discouraged.  she  should  keep the bedroom cool and dark.  Pets should not be allowed to sleep in the bed.      Repeat testing is indicated if symptoms worsen.

## (undated) DEVICE — CATH IV AUTOGRD BC PNK 20GA 25 -- INSYTE

## (undated) DEVICE — NEONATAL-ADULT SPO2 SENSOR: Brand: NELLCOR

## (undated) DEVICE — TOWEL 4 PLY TISS 19X30 SUE WHT

## (undated) DEVICE — HYPODERMIC SAFETY NEEDLE: Brand: MAGELLAN

## (undated) DEVICE — Device: Brand: MEDEX

## (undated) DEVICE — Z DISCONTINUED PER MEDLINE LINE GAS SAMPLING O2/CO2 LNG AD 13 FT NSL W/ TBNG FILTERLINE

## (undated) DEVICE — SET ADMIN 16ML TBNG L100IN 2 Y INJ SITE IV PIGGY BK DISP

## (undated) DEVICE — Device

## (undated) DEVICE — SYR 3ML LL TIP 1/10ML GRAD --

## (undated) DEVICE — CONTAINER SPEC 20 ML LID NEUT BUFF FORMALIN 10 % POLYPR STS

## (undated) DEVICE — ELECTRODE,RADIOTRANSLUCENT,FOAM,5PK: Brand: MEDLINE

## (undated) DEVICE — BAG SPEC BIOHZRD 10 X 10 IN --

## (undated) DEVICE — FORCEPS BX L160CM DIA8MM GRSP DISECT CUP TIP NONLOCKING ROT

## (undated) DEVICE — BASIN EMSIS 16OZ GRAPHITE PLAS KID SHP MOLD GRAD FOR ORAL

## (undated) DEVICE — CUFF ADULT 1 PC 1 VINYL DISP --

## (undated) DEVICE — KENDALL RADIOLUCENT FOAM MONITORING ELECTRODE RECTANGULAR SHAPE: Brand: KENDALL

## (undated) DEVICE — NEEDLE HYPO 18GA L1.5IN PNK S STL HUB POLYPR SHLD REG BVL

## (undated) DEVICE — SYR 10ML LUER LOK 1/5ML GRAD --

## (undated) DEVICE — BASIN EMESIS 500CC ROSE 250/CS 60/PLT: Brand: MEDEGEN MEDICAL PRODUCTS, LLC

## (undated) DEVICE — (D)SYR 10ML 1/5ML GRAD NSAF -- PKGING CHANGE USE ITEM 338027

## (undated) DEVICE — 1200 GUARD II KIT W/5MM TUBE W/O VAC TUBE: Brand: GUARDIAN

## (undated) DEVICE — MEDI-VAC YANK SUCT HNDL W/TPRD BULBOUS TIP: Brand: CARDINAL HEALTH

## (undated) DEVICE — SOLIDIFIER MEDC 1200ML -- CONVERT TO 356117

## (undated) DEVICE — SOLIDIFIER FLD 2OZ 1500CC N DISINF IN BTL DISP SAFESORB

## (undated) DEVICE — BLOCK BITE ENDOSCP AD 21 MM W/ DIL BLU LF DISP